# Patient Record
Sex: FEMALE | Race: BLACK OR AFRICAN AMERICAN | Employment: UNEMPLOYED | URBAN - METROPOLITAN AREA
[De-identification: names, ages, dates, MRNs, and addresses within clinical notes are randomized per-mention and may not be internally consistent; named-entity substitution may affect disease eponyms.]

---

## 2018-02-11 ENCOUNTER — ED HISTORICAL/CONVERTED ENCOUNTER (OUTPATIENT)
Dept: OTHER | Age: 33
End: 2018-02-11

## 2018-05-17 LAB
ANTIBODY SCREEN, EXTERNAL: NEGATIVE
CHLAMYDIA, EXTERNAL: NEGATIVE
HBSAG, EXTERNAL: NEGATIVE
HIV, EXTERNAL: NEGATIVE
N. GONORRHEA, EXTERNAL: NEGATIVE
RPR, EXTERNAL: REACTIVE
RUBELLA, EXTERNAL: NORMAL
TYPE, ABO & RH, EXTERNAL: NORMAL

## 2018-07-03 ENCOUNTER — HOSPITAL ENCOUNTER (OUTPATIENT)
Dept: PERINATAL CARE | Age: 33
Discharge: HOME | End: 2018-07-03
Attending: OBSTETRICS & GYNECOLOGY
Payer: MEDICAID

## 2018-07-03 PROCEDURE — 76811 OB US DETAILED SNGL FETUS: CPT | Performed by: OBSTETRICS & GYNECOLOGY

## 2018-08-24 ENCOUNTER — HOSPITAL ENCOUNTER (INPATIENT)
Age: 33
LOS: 6 days | Discharge: HOME | DRG: 560 | End: 2018-08-31
Attending: OBSTETRICS & GYNECOLOGY | Admitting: OBSTETRICS & GYNECOLOGY
Payer: MEDICAID

## 2018-08-24 DIAGNOSIS — Z34.90 PREGNANCY, UNSPECIFIED GESTATIONAL AGE: Primary | ICD-10-CM

## 2018-08-24 LAB
ALBUMIN SERPL-MCNC: 2.6 G/DL (ref 3.5–5)
ALBUMIN/GLOB SERPL: 0.6 {RATIO} (ref 1.1–2.2)
ALP SERPL-CCNC: 153 U/L (ref 45–117)
ALT SERPL-CCNC: 15 U/L (ref 12–78)
ANION GAP SERPL CALC-SCNC: 11 MMOL/L (ref 5–15)
AST SERPL-CCNC: 15 U/L (ref 15–37)
BASOPHILS # BLD: 0 K/UL (ref 0–0.1)
BASOPHILS NFR BLD: 0 % (ref 0–1)
BILIRUB SERPL-MCNC: 0.3 MG/DL (ref 0.2–1)
BUN SERPL-MCNC: 5 MG/DL (ref 6–20)
BUN/CREAT SERPL: 7 (ref 12–20)
CALCIUM SERPL-MCNC: 8.6 MG/DL (ref 8.5–10.1)
CHLORIDE SERPL-SCNC: 105 MMOL/L (ref 97–108)
CO2 SERPL-SCNC: 22 MMOL/L (ref 21–32)
CREAT SERPL-MCNC: 0.73 MG/DL (ref 0.55–1.02)
CREAT UR-MCNC: 142.02 MG/DL
DIFFERENTIAL METHOD BLD: ABNORMAL
EOSINOPHIL # BLD: 0.1 K/UL (ref 0–0.4)
EOSINOPHIL NFR BLD: 1 % (ref 0–7)
ERYTHROCYTE [DISTWIDTH] IN BLOOD BY AUTOMATED COUNT: 14.2 % (ref 11.5–14.5)
EST. AVERAGE GLUCOSE BLD GHB EST-MCNC: 126 MG/DL
GLOBULIN SER CALC-MCNC: 4.3 G/DL (ref 2–4)
GLUCOSE BLD STRIP.AUTO-MCNC: 112 MG/DL (ref 65–100)
GLUCOSE BLD STRIP.AUTO-MCNC: 147 MG/DL (ref 65–100)
GLUCOSE BLD STRIP.AUTO-MCNC: 89 MG/DL (ref 65–100)
GLUCOSE SERPL-MCNC: 93 MG/DL (ref 65–100)
HBA1C MFR BLD: 6 % (ref 4.2–6.3)
HCT VFR BLD AUTO: 32.8 % (ref 35–47)
HGB BLD-MCNC: 10.8 G/DL (ref 11.5–16)
IMM GRANULOCYTES # BLD: 0.1 K/UL (ref 0–0.04)
IMM GRANULOCYTES NFR BLD AUTO: 1 % (ref 0–0.5)
LYMPHOCYTES # BLD: 3 K/UL (ref 0.8–3.5)
LYMPHOCYTES NFR BLD: 27 % (ref 12–49)
MCH RBC QN AUTO: 28.2 PG (ref 26–34)
MCHC RBC AUTO-ENTMCNC: 32.9 G/DL (ref 30–36.5)
MCV RBC AUTO: 85.6 FL (ref 80–99)
MONOCYTES # BLD: 1 K/UL (ref 0–1)
MONOCYTES NFR BLD: 9 % (ref 5–13)
NEUTS SEG # BLD: 6.7 K/UL (ref 1.8–8)
NEUTS SEG NFR BLD: 61 % (ref 32–75)
NRBC # BLD: 0 K/UL (ref 0–0.01)
NRBC BLD-RTO: 0 PER 100 WBC
PLATELET # BLD AUTO: 185 K/UL (ref 150–400)
PMV BLD AUTO: 12.5 FL (ref 8.9–12.9)
POTASSIUM SERPL-SCNC: 4 MMOL/L (ref 3.5–5.1)
PROT SERPL-MCNC: 6.9 G/DL (ref 6.4–8.2)
PROT UR-MCNC: 34 MG/DL (ref 0–11.9)
PROT/CREAT UR-RTO: 0.2
RBC # BLD AUTO: 3.83 M/UL (ref 3.8–5.2)
SERVICE CMNT-IMP: ABNORMAL
SERVICE CMNT-IMP: ABNORMAL
SERVICE CMNT-IMP: NORMAL
SODIUM SERPL-SCNC: 138 MMOL/L (ref 136–145)
URATE SERPL-MCNC: 3.4 MG/DL (ref 2.6–6)
WBC # BLD AUTO: 10.9 K/UL (ref 3.6–11)

## 2018-08-24 PROCEDURE — 80053 COMPREHEN METABOLIC PANEL: CPT | Performed by: OBSTETRICS & GYNECOLOGY

## 2018-08-24 PROCEDURE — 76816 OB US FOLLOW-UP PER FETUS: CPT | Performed by: OBSTETRICS & GYNECOLOGY

## 2018-08-24 PROCEDURE — 85025 COMPLETE CBC W/AUTO DIFF WBC: CPT | Performed by: OBSTETRICS & GYNECOLOGY

## 2018-08-24 PROCEDURE — 36415 COLL VENOUS BLD VENIPUNCTURE: CPT | Performed by: OBSTETRICS & GYNECOLOGY

## 2018-08-24 PROCEDURE — 75410000002 HC LABOR FEE PER 1 HR: Performed by: OBSTETRICS & GYNECOLOGY

## 2018-08-24 PROCEDURE — 99285 EMERGENCY DEPT VISIT HI MDM: CPT

## 2018-08-24 PROCEDURE — 76819 FETAL BIOPHYS PROFIL W/O NST: CPT | Performed by: OBSTETRICS & GYNECOLOGY

## 2018-08-24 PROCEDURE — 75410000003 HC RECOV DEL/VAG/CSECN EA 0.5 HR: Performed by: OBSTETRICS & GYNECOLOGY

## 2018-08-24 PROCEDURE — 82570 ASSAY OF URINE CREATININE: CPT | Performed by: OBSTETRICS & GYNECOLOGY

## 2018-08-24 PROCEDURE — 59025 FETAL NON-STRESS TEST: CPT

## 2018-08-24 PROCEDURE — 75410000000 HC DELIVERY VAGINAL/SINGLE: Performed by: OBSTETRICS & GYNECOLOGY

## 2018-08-24 PROCEDURE — 84156 ASSAY OF PROTEIN URINE: CPT | Performed by: OBSTETRICS & GYNECOLOGY

## 2018-08-24 PROCEDURE — 82962 GLUCOSE BLOOD TEST: CPT

## 2018-08-24 PROCEDURE — 87255 GENET VIRUS ISOLATE HSV: CPT | Performed by: OBSTETRICS & GYNECOLOGY

## 2018-08-24 PROCEDURE — 83036 HEMOGLOBIN GLYCOSYLATED A1C: CPT | Performed by: OBSTETRICS & GYNECOLOGY

## 2018-08-24 PROCEDURE — 76060000078 HC EPIDURAL ANESTHESIA: Performed by: ANESTHESIOLOGY

## 2018-08-24 PROCEDURE — 74011250637 HC RX REV CODE- 250/637: Performed by: OBSTETRICS & GYNECOLOGY

## 2018-08-24 PROCEDURE — 84550 ASSAY OF BLOOD/URIC ACID: CPT | Performed by: OBSTETRICS & GYNECOLOGY

## 2018-08-24 PROCEDURE — 87491 CHLMYD TRACH DNA AMP PROBE: CPT | Performed by: OBSTETRICS & GYNECOLOGY

## 2018-08-24 RX ORDER — METRONIDAZOLE 250 MG/1
500 TABLET ORAL
Status: DISCONTINUED | OUTPATIENT
Start: 2018-08-24 | End: 2018-08-24

## 2018-08-24 RX ORDER — METRONIDAZOLE 250 MG/1
2000 TABLET ORAL
Status: COMPLETED | OUTPATIENT
Start: 2018-08-24 | End: 2018-08-24

## 2018-08-24 RX ORDER — FLUCONAZOLE 100 MG/1
150 TABLET ORAL DAILY
Status: DISCONTINUED | OUTPATIENT
Start: 2018-08-25 | End: 2018-08-24

## 2018-08-24 RX ORDER — FLUCONAZOLE 100 MG/1
150 TABLET ORAL
Status: COMPLETED | OUTPATIENT
Start: 2018-08-24 | End: 2018-08-24

## 2018-08-24 RX ADMIN — FLUCONAZOLE 150 MG: 100 TABLET ORAL at 14:36

## 2018-08-24 RX ADMIN — METRONIDAZOLE 2000 MG: 250 TABLET ORAL at 12:45

## 2018-08-24 NOTE — IP AVS SNAPSHOT
Gume Yang 
 
 
 [address] [id number] Patient: Trinity Norman MRN: [id number] :1985 A check virginia indicates which time of day the medication should be taken. My Medications START taking these medications Instructions Each Dose to Equal  
 Morning Noon Evening Bedtime  
 oxyCODONE-acetaminophen 5-325 mg per tablet Commonly known as:  PERCOCET Your last dose was: Your next dose is: Take 1 Tab by mouth every four (4) hours as needed. Max Daily Amount: 6 Tabs. 1 Tab Where to Get Your Medications Information on where to get these meds will be given to you by the nurse or doctor. ! Ask your nurse or doctor about these medications  
  oxyCODONE-acetaminophen 5-325 mg per tablet

## 2018-08-24 NOTE — PROGRESS NOTES
1035:  The patient presents to labor and delivery for Pre-E evaluation. 1055:  Labs drawn and sent. 1150:  Telephone call to notify Dr. Carmen Joseph that the patient had arrived to [facility] to be evaluated. Lab results were reviewed, blood pressures were reviewed. Orders received. 1155:  Called the  center to notify them that Dr. Carmen Joseph had requested a consult.

## 2018-08-24 NOTE — H&P
History & Physical    Name: Hardy Lovell MRN: [id number]  SSN: [id number]    YOB: 1985  Age: 29 y.o. Sex: female      Subjective:     Reason for Admission:  Pregnancy and Diabetes Gestational - Poorly Controlled  and elevated blood pressures in the office yesterday    History of Present Illness: Ms. Nayan Hamilton is a 29 y.o.  female with an estimated gestational age of 42w0d with Estimated Date of Delivery: 18. Patient complains of vulvovaginal itching/irritation. for several  days. Pregnancy has been complicated by diabetes - gestational and poor compliance. Patient denies contractions, headache  and visual disturbances. Seen in the office yesterday for the first time in about 8 weeks. She had been diagnosed with GDM but did not go to seen endocrinology. In the office yesterday she had elevated blood pressures and vulvovaginal sx. Exam showed trich and yeast. Also had some lacerations/?ulcerations on vulva (pt reports lots of scratching). Hx of c/s with last pregnancy, 3 SVDs prior. Wants TOLAC. Tested positive for syphilis at first prenatal visit, treated. OB History    Para Term  AB Living   5 4       SAB TAB Ectopic Molar Multiple Live Births              # Outcome Date GA Lbr Jay/2nd Weight Sex Delivery Anes PTL Lv   5 Current            4 Para            3 Para            2 Para            1 Para                 Past Medical History:   Diagnosis Date    Diabetes ([facility] [address]     Syphilis      No past surgical history on file. Social History     Occupational History    Not on file. Social History Main Topics    Smoking status: Former Smoker     Packs/day: 0.25     Years: 2.00    Smokeless tobacco: Never Used    Alcohol use No    Drug use: No    Sexual activity: Yes     Partners: Male      No family history on file.     No Known Allergies  Prior to Admission medications    Not on File        Review of Systems:  Pertinent items are noted in the History of Present Illness. Objective:     Vitals:    Vitals:    18 1131 18 1136 18 1140 18 1449   BP:   127/77    Pulse:   87    Resp:       Temp:       SpO2: 100% 100%     Weight:    134.3 kg (296 lb)   Height:    5' 6\" (1.676 m)      Temp (24hrs), Av.3 °F (36.8 °C), Min:98.3 °F (36.8 °C), Max:98.3 °F (36.8 °C)    BP  Min: 124/70  Max: 135/75     Physical Exam:  Patient without distress. Heart: Regular rate and rhythm, S1S2 present or 2/6 DENNIS  Lung: clear to auscultation throughout lung fields, no wheezes, no rales, no rhonchi and normal respiratory effort  Abdomen: Obese, soft, nontender  Fundus: soft and non tender  Perineum: blood absent, amniotic fluid absent  Cervical Exam: 0 cm dilated    Lower Extremities:  - Edema No   - No cords or calf tenderness. Membranes:  Intact  Uterine Activity:  None  Fetal Heart Rate:  Reactive       Lab/Data Review:  Recent Results (from the past 24 hour(s))   CBC WITH AUTOMATED DIFF    Collection Time: 18 10:44 AM   Result Value Ref Range    WBC 10.9 3.6 - 11.0 K/uL    RBC 3.83 3.80 - 5.20 M/uL    HGB 10.8 (L) 11.5 - 16.0 g/dL    HCT 32.8 (L) 35.0 - 47.0 %    MCV 85.6 80.0 - 99.0 FL    MCH 28.2 26.0 - 34.0 PG    MCHC 32.9 30.0 - 36.5 g/dL    RDW 14.2 11.5 - 14.5 %    PLATELET 373 363 - 221 K/uL    MPV 12.5 8.9 - 12.9 FL    NRBC 0.0 0  WBC    ABSOLUTE NRBC 0.00 0.00 - 0.01 K/uL    NEUTROPHILS 61 32 - 75 %    LYMPHOCYTES 27 12 - 49 %    MONOCYTES 9 5 - 13 %    EOSINOPHILS 1 0 - 7 %    BASOPHILS 0 0 - 1 %    IMMATURE GRANULOCYTES 1 (H) 0.0 - 0.5 %    ABS. NEUTROPHILS 6.7 1.8 - 8.0 K/UL    ABS. LYMPHOCYTES 3.0 0.8 - 3.5 K/UL    ABS. MONOCYTES 1.0 0.0 - 1.0 K/UL    ABS. EOSINOPHILS 0.1 0.0 - 0.4 K/UL    ABS. BASOPHILS 0.0 0.0 - 0.1 K/UL    ABS. IMM.  GRANS. 0.1 (H) 0.00 - 0.04 K/UL    DF AUTOMATED     METABOLIC PANEL, COMPREHENSIVE    Collection Time: 18 10:44 AM   Result Value Ref Range    Sodium 138 136 - 145 mmol/L    Potassium 4.0 3.5 - 5.1 mmol/L    Chloride 105 97 - 108 mmol/L    CO2 22 21 - 32 mmol/L    Anion gap 11 5 - 15 mmol/L    Glucose 93 65 - 100 mg/dL    BUN 5 (L) 6 - 20 MG/DL    Creatinine 0.73 0.55 - 1.02 MG/DL    BUN/Creatinine ratio 7 (L) 12 - 20      GFR est AA >60 >60 ml/min/1.73m2    GFR est non-AA >60 >60 ml/min/1.73m2    Calcium 8.6 8.5 - 10.1 MG/DL    Bilirubin, total 0.3 0.2 - 1.0 MG/DL    ALT (SGPT) 15 12 - 78 U/L    AST (SGOT) 15 15 - 37 U/L    Alk. phosphatase 153 (H) 45 - 117 U/L    Protein, total 6.9 6.4 - 8.2 g/dL    Albumin 2.6 (L) 3.5 - 5.0 g/dL    Globulin 4.3 (H) 2.0 - 4.0 g/dL    A-G Ratio 0.6 (L) 1.1 - 2.2     URIC ACID    Collection Time: 18 10:44 AM   Result Value Ref Range    Uric acid 3.4 2.6 - 6.0 MG/DL   PROTEIN/CREATININE RATIO, URINE    Collection Time: 18 10:44 AM   Result Value Ref Range    Protein, urine random 34 (H) 0.0 - 11.9 mg/dL    Creatinine, urine 142.02 mg/dL    Protein/Creat. urine Ratio 0.2     HEMOGLOBIN A1C WITH EAG    Collection Time: 18 10:44 AM   Result Value Ref Range    Hemoglobin A1c 6.0 4.2 - 6.3 %    Est. average glucose 126 mg/dL   GLUCOSE, POC    Collection Time: 18  2:42 PM   Result Value Ref Range    Glucose (POC) 112 (H) 65 - 100 mg/dL    Performed by Lawyer Murcia    GLUCOSE, POC    Collection Time: 18  6:08 PM   Result Value Ref Range    Glucose (POC) 147 (H) 65 - 100 mg/dL    Performed by Raghav Gonzalez and Plan:32 yo  with IUP at 38 weeks  2. GDM--unsure of control as above. Will check BS over next 24 hours. 3. Elevated BPs in office, basically wnl here. Labs wnl thus far, getting 24 hr urine. 4. Vulvar changes--HSV culture today. 5. Trich--check for GC/C     Active Problems:    * No active hospital problems.  *     - Diabetes-Gestational:  Blood sugar monitoring at fasting and 2 hours after each meal    Signed By:  Milad Fuentes MD     2018

## 2018-08-24 NOTE — IP AVS SNAPSHOT
[facility] 
 
 
 [address] [facility] 
[phone] Patient: Giuseppe Schultz MRN: [id number] :1985 About your hospitalization You were admitted on:  2018 [facility] in the:  [facility] [address] You were discharged on:  2018 Why you were hospitalized Your primary diagnosis was:  Not on File Your diagnoses also included:  Pregnancy Follow-up Information Follow up With Details Comments Contact Julius Cotton, [address]  [address] [facility] 
[phone] Discharge Orders None A check virginia indicates which time of day the medication should be taken. My Medications START taking these medications Instructions Each Dose to Equal  
 Morning Noon Evening Bedtime  
 oxyCODONE-acetaminophen 5-325 mg per tablet Commonly known as:  PERCOCET Your last dose was: Your next dose is: Take 1 Tab by mouth every four (4) hours as needed. Max Daily Amount: 6 Tabs. 1 Tab Where to Get Your Medications Information on where to get these meds will be given to you by the nurse or doctor. ! Ask your nurse or doctor about these medications  
  oxyCODONE-acetaminophen 5-325 mg per tablet Opioid Education Prescription Opioids: What You Need to Know: 
 
Prescription opioids can be used to help relieve moderate-to-severe pain and are often prescribed following a surgery or injury, or for certain health conditions. These medications can be an important part of treatment but also come with serious risks. Opioids are strong pain medicines. Examples include hydrocodone, oxycodone, fentanyl, and morphine. Heroin is an example of an illegal opioid. It is important to work with your health care provider to make sure you are getting the safest, most effective care. WHAT ARE THE RISKS AND SIDE EFFECTS OF OPIOID USE? Prescription opioids carry serious risks of addiction and overdose, especially with prolonged use. An opioid overdose, often marked by slow breathing, can cause sudden death. The use of prescription opioids can have a number of side effects as well, even when taken as directed. · Tolerance-meaning you might need to take more of a medication for the same pain relief · Physical dependence-meaning you have symptoms of withdrawal when the medication is stopped. Withdrawal symptoms can include nausea, sweating, chills, diarrhea, stomach cramps, and muscle aches. Withdrawal can last up to several weeks, depending on which drug you took and how long you took it. · Increased sensitivity to pain · Constipation · Nausea, vomiting, and dry mouth · Sleepiness and dizziness · Confusion · Depression · Low levels of testosterone that can result in lower sex drive, energy, and strength · Itching and sweating RISKS ARE GREATER WITH:      
· History of drug misuse, substance use disorder, or overdose · Mental health conditions (such as depression or anxiety) · Sleep apnea · Older age (72 years or older) · Pregnancy Avoid alcohol while taking prescription opioids. Also, unless specifically advised by your health care provider, medications to avoid include: · Benzodiazepines (such as Xanax or Valium) · Muscle relaxants (such as Soma or Flexeril) · Hypnotics (such as Ambien or Lunesta) · Other prescription opioids KNOW YOUR OPTIONS Talk to your health care provider about ways to manage your pain that don't involve prescription opioids. Some of these options may actually work better and have fewer risks and side effects. Options may include: 
· Pain relievers such as acetaminophen, ibuprofen, and naproxen · Some medications that are also used for depression or seizures · Physical therapy and exercise · Counseling to help patients learn how to cope better with triggers of pain and stress. · Application of heat or cold compress · Massage therapy · Relaxation techniques Be Informed Make sure you know the name of your medication, how much and how often to take it, and its potential risks & side effects. IF YOU ARE PRESCRIBED OPIOIDS FOR PAIN: 
· Never take opioids in greater amounts or more often than prescribed. Remember the goal is not to be pain-free but to manage your pain at a tolerable level. · Follow up with your primary care provider to: · Work together to create a plan on how to manage your pain. · Talk about ways to help manage your pain that don't involve prescription opioids. · Talk about any and all concerns and side effects. · Help prevent misuse and abuse. · Never sell or share prescription opioids · Help prevent misuse and abuse. · Store prescription opioids in a secure place and out of reach of others (this may include visitors, children, friends, and family). · Safely dispose of unused/unwanted prescription opioids: Find your community drug take-back program or your pharmacy mail-back program, or flush them down the toilet, following guidance from the Food and Drug Administration (www.fda.gov/Drugs/ResourcesForYou). · Visit www.cdc.gov/drugoverdose to learn about the risks of opioid abuse and overdose. · If you believe you may be struggling with addiction, tell your health care provider and ask for guidance or call [address] [place], [place] [address] at 2-853-434-UWFZ. Discharge Instructions Discharge Instructions for Vaginal Delivery Patient ID: 
Trinity Norman 
[id number] 
4 y.o. 
1985 Take Home Medications Continue taking your prenatal vitamins if you are breastfeeding. Follow-up care is a key part of your treatment and safety.  Please schedule and keep appointments. Follow-up with your primary OB in 6 weeks. Activity Avoid anything in your vagina for 6 weeks (no intercourse, tampons, or douching). You may drive unless you are taking prescription pain medications. Climbing stairs and light lifting are okay. Please avoid excessive exercise, though walking is okay- you'll be tired! Diet Regular diet as tolerated. Be sure to drink plenty of fluids if you are breastfeeding. Wound care If you have stitches, continue to rinse with a squirt bottle of warm water each time you void for about 7-10 days. .  Your stitches will gradually dissolve over four to eight weeks. Sitz baths are also helpful to keep the wound clean, encourage healing, and to help with pain associated with the stitches or hemorrhoids. You can use either a sitz bath basin or a bathtub filled with 2-3\" inches of plain warm water. Soak for 10 minutes 3 times a day as tolerated. Pain Management 1. Over the counter medications such as Tylenol and ibuprofen (Motrin or Advil) are ideal.  These may be taken together, alternating doses. You may  take the maximum dose:  Motrin or Advil (generic ibuprofen), either 3 tablets every 6 hours or 4 tablets every 8 hours or Tylenol (acetominophen) 1000mg every 6 hours (equivalent to 2 extra strength Tylenol). 2. You may also have a precrescription for stronger pain medication. Take only as needed and transition to over the counter medication in the next few days. Minimize amounts of the prescription medication, as it can be habit-forming and will worsen or cause constipation. Most patients will find that within a couple of days, their pain is adequately controlled using only over-the-counter medications. 3. The prescription pain medication is mixed with Tylenol, therefore, you should not take any extra Tylenol or acetaminophen until you have reduced your prescription pain medication. 4. Add heating pad or sitz baths as needed. Add hemorrhoid wipes or ointments if needed Constipation 1. Constipation is normal after pregnancy and delivery, especially while taking prescription narcotic pain medication. 2. Over the counter remedies including ducosate (Colace), take 1-2 capsules 1-2 times daily for soft stool as needed. You may also add/ try milk of magnesia or rectal remedies such as Dulcolax or Fleets enema. Recovery: What to Expect at [facility] 1. Fatigue is expected. Try to rest when you can and don't worry about doing housework or other tasks which can wait. 2. The soreness along your bottom will improve significantly over the first 2 weeks, but it may take 6 weeks before you are completely recovered. 3. Back pain or general body aches or muscle soreness are expected and should improve with acetominophen or ibuprofen. 4. Leg swelling due to pregnancy and/or IV fluids given in the hospital will take about two weeks to resolve. 5. Most women experience some form of the \"Baby Blues\" after having a baby. Feeling emotional, tearful, frustrated, anxious, sad, and irritable some of the time is normal and go away after about 2 weeks. Adequate rest and help from your family will help. Take breaks from caring for the baby. Call your doctor if your symptoms seem severe, last more than 2 weeks, or seem to be getting worse instead of better. Get help immediately if you have thoughts of wanting to hurt yourself or others! Call your doctor or seek immediate medical care if you have: 
Heavy vaginal bleeding, soaking through one or more pads an hour for several hours. Foul-smelling discharge from your vagina or incision. Consistent nausea and vomiting and cannot keep fluids down. Consistent pain that does not get better after you take pain medicine. Sudden chest pain and shortness of breath Signs of a blood clot: pain/ swelling/ increasing redness in your lower extremeties Signs of infection: increased pain in your abdomen or vaginal area; red streaks, warmth, or tenderness of your breasts; fever of 100.5 F or greater [employer] Announcement We are excited to announce that we are making your provider's discharge notes available to you in [email or web address]. You will see these notes when they are completed and signed by the physician that discharged you from your recent hospital stay. If you have any questions or concerns about any information you see in Judic[employer], please call the Health Information Department where you were seen or reach out to your Primary Care Provider for more information about your plan of care. Introducing [facility]! Sandra Castro introduces [email or web address] patient portal. Now you can access parts of your medical record, email your doctor's office, and request medication refills online. 1. In your internet browser, go to [email or web address] 2. Click on the First Time User? Click Here link in the Sign In box. You will see the New Member Sign Up page. 3. Enter your [email or web address] Access Code exactly as it appears below. You will not need to use this code after youve completed the sign-up process. If you do not sign up before the expiration date, you must request a new code. · [email or web address] Access Code: [email or web address] Expires: 11/29/2018  5:52 PM 
 
4. Enter the last four digits of your Social Security Number (xxxx) and Date of Birth (mm/dd/yyyy) as indicated and click Submit. You will be taken to the next sign-up page. 5. Create a Medifyt ID. This will be your [email or web address] login ID and cannot be changed, so think of one that is secure and easy to remember. 6. Create a Medifyt password. You can change your password at any time. 7. Enter your Password Reset Question and Answer. This can be used at a later time if you forget your password. 8. Enter your e-mail address.  You will receive e-mail notification when new information is available in [employer]. 9. Click Sign Up. You can now view and download portions of your medical record. 10. Click the Download Summary menu link to download a portable copy of your medical information. If you have questions, please visit the Frequently Asked Questions section of the [employer] website. Remember, [employer] is NOT to be used for urgent needs. For medical emergencies, dial 911. Now available from your iPhone and Android! Introducing Karri Alexandre As a [employer] [facility] patient, I wanted to make you aware of our electronic visit tool called Karri Alexandre. [employer] 24/7 allows you to connect within minutes with a medical provider 24 hours a day, seven days a week via a mobile device or tablet or logging into a secure website from your computer. You can access Karri Alexandre from anywhere in the [place]. A virtual visit might be right for you when you have a simple condition and feel like you just dont want to get out of bed, or cant get away from work for an appointment, when your regular [facility] [employer] [facility] provider is not available (evenings, weekends or holidays), or when youre out of town and need minor care. Electronic visits cost only $49 and if the [employer] 24/7 provider determines a prescription is needed to treat your condition, one can be electronically transmitted to a nearby pharmacy*. Please take a moment to enroll today if you have not already done so. The enrollment process is free and takes just a few minutes. To enroll, please download the [employer] 24/7 hunter to your tablet or phone, or visit [email or web address] to enroll on your computer. And, as an [address] [place] [address] patient with a [email or web address] account, the results of your visits will be scanned into your electronic medical record and your primary care provider will be able to view the scanned results. We urge you to continue to see your regular [facility] provider for your ongoing medical care. And while your primary care provider may not be the one available when you seek a Karri Perezmichael virtual visit, the peace of mind you get from getting a real diagnosis real time can be priceless. For more information on Karri Perezihsanfin, view our Frequently Asked Questions (FAQs) at [email or web address]. Sincerely, 
 
Brittany Price MD 
Chief Medical Officer [employer] *:  certain medications cannot be prescribed via Karri Alexandre Providers Seen During Your Hospitalization Provider Specialty Primary office phone Candice Griffin MD Obstetrics & Gynecology [phone] Your Primary Care Physician (PCP) Primary Care Physician Office Phone Office Fax Betty Pena [phone] You are allergic to the following No active allergies Recent Documentation Height Weight Breastfeeding? BMI OB Status Smoking Status 1.676 m 134.3 kg Yes 47.78 kg/m2 Recent pregnancy Former Smoker Emergency Contacts Name Discharge Info Relation Home Work Mobile [facility] CAREGIVER [3] Mother [14] [phone] Patient Belongings The following personal items are in your possession at time of discharge: 
     Visual Aid: None          Jewelry: Other (comment) (lip ring)  Clothing: At bedside, Footwear, Pants, Shirt    Other Valuables: Cell Phone  Personal Items Sent to Safe: none Please provide this summary of care documentation to your next provider. Signatures-by signing, you are acknowledging that this After Visit Summary has been reviewed with you and you have received a copy. Patient Signature:  ____________________________________________________________ Date:  ____________________________________________________________  
  
Bettye Carney  Provider Signature: ____________________________________________________________ Date:  ____________________________________________________________

## 2018-08-25 PROBLEM — Z34.90 PREGNANCY: Status: ACTIVE | Noted: 2018-08-25

## 2018-08-25 LAB
COLLECT DURATION TIME UR: 24 HR
COLLECT DURATION TIME UR: 24 HR
CREAT 24H UR-MRATE: 2219 MG/24HR (ref 600–1800)
GLUCOSE BLD STRIP.AUTO-MCNC: 108 MG/DL (ref 65–100)
GLUCOSE BLD STRIP.AUTO-MCNC: 128 MG/DL (ref 65–100)
GLUCOSE BLD STRIP.AUTO-MCNC: 137 MG/DL (ref 65–100)
GLUCOSE BLD STRIP.AUTO-MCNC: 162 MG/DL (ref 65–100)
GLUCOSE BLD STRIP.AUTO-MCNC: 82 MG/DL (ref 65–100)
PROT 24H UR-MRATE: 405 MG/24HR
SERVICE CMNT-IMP: ABNORMAL
SERVICE CMNT-IMP: NORMAL
SPECIMEN VOL ?TM UR: 2700 ML
SPECIMEN VOL ?TM UR: 2700 ML
UR CULT HOLD, URHOLD: NORMAL

## 2018-08-25 PROCEDURE — 65270000029 HC RM PRIVATE

## 2018-08-25 PROCEDURE — 59025 FETAL NON-STRESS TEST: CPT

## 2018-08-25 PROCEDURE — 82962 GLUCOSE BLOOD TEST: CPT

## 2018-08-25 PROCEDURE — 74011636637 HC RX REV CODE- 636/637: Performed by: OBSTETRICS & GYNECOLOGY

## 2018-08-25 PROCEDURE — 75410000002 HC LABOR FEE PER 1 HR: Performed by: OBSTETRICS & GYNECOLOGY

## 2018-08-25 PROCEDURE — 74011250637 HC RX REV CODE- 250/637: Performed by: OBSTETRICS & GYNECOLOGY

## 2018-08-25 RX ORDER — INSULIN LISPRO 100 [IU]/ML
INJECTION, SOLUTION INTRAVENOUS; SUBCUTANEOUS
Status: DISCONTINUED | OUTPATIENT
Start: 2018-08-25 | End: 2018-08-26

## 2018-08-25 RX ORDER — MAGNESIUM SULFATE 100 %
4 CRYSTALS MISCELLANEOUS AS NEEDED
Status: DISCONTINUED | OUTPATIENT
Start: 2018-08-25 | End: 2018-08-29

## 2018-08-25 RX ORDER — ZOLPIDEM TARTRATE 5 MG/1
5 TABLET ORAL
Status: DISCONTINUED | OUTPATIENT
Start: 2018-08-25 | End: 2018-08-29

## 2018-08-25 RX ORDER — DEXTROSE 50 % IN WATER (D50W) INTRAVENOUS SYRINGE
12.5-25 AS NEEDED
Status: DISCONTINUED | OUTPATIENT
Start: 2018-08-25 | End: 2018-08-29

## 2018-08-25 RX ADMIN — HUMAN INSULIN 8 UNITS: 100 INJECTION, SUSPENSION SUBCUTANEOUS at 22:02

## 2018-08-25 RX ADMIN — ZOLPIDEM TARTRATE 5 MG: 5 TABLET ORAL at 23:02

## 2018-08-25 NOTE — PROGRESS NOTES
1915 Assumed care of pt at this time from [address] [place] [address], Ruddy Nunez RN.  2015 Pt in bed resting on phone, declines assessment at this time. 2300 Pt resting in bed, declines any needs at this time. 0700 Bedside shift change report given to Linda Bolanos RN (oncoming nurse) by CALEB Oliveira RN (offgoing nurse). Report included the following information SBAR, Kardex, Intake/Output, MAR, Recent Results and Med Rec Status.

## 2018-08-25 NOTE — PROGRESS NOTES
1923: SBAR report received from off coming nurse Vikki Perla RN, with preceptor CHIKI Rebolledo RNC present. 1940: Student nurse and RN entered pt's room to perform assessment. Pt does not appear to be in acute distress, pt has no complaints other than stating she has irregular mild contractions. Education on Intake and Output given to pt. Pt to record urine output on white board. After complete assessment, pt is resting comfortably in bed. Filled up water jug to 1000ml. 2010: Pt signed admission consents. 2216: Pt given insulin. Pt given diet ginger ale and is resting comfortably. 2221: Order obtained for Ambien 5mg. Pt declined at this time, requested to take at 2300.    2302: Ambien 5mg given to pt. Pt resting comfortably with no complaints. 0140: Rounded on pt. Pt asleep and breathing evenly. 0305: Rounded on pt. Pt asleep and breathing evenly. 0500: Rounded on pt. Pt awake in bed resting quietly, no complaints at this time.

## 2018-08-25 NOTE — PROGRESS NOTES
High Risk Obstetrics Progress Note    Name: José Miguel Anderson MRN: [id number]  SSN: [id number]    YOB: 1985  Age: 29 y.o. Sex: female      Subjective:      LOS: 0 days    Estimated Date of Delivery: 18   Gestational Age Today: 43w4d     Patient admitted for diabetes - gestational and elevated BPs. . States she does have normal fetal movement and does not have abdominal pain  , vaginal bleeding  and visual disturbances. Mild HA this am.     Objective:     Vitals:  Blood pressure 142/78, pulse 86, temperature 98.2 °F (36.8 °C), resp. rate 18, height 5' 6\" (1.676 m), weight 134.3 kg (296 lb), SpO2 (!) 82 %, currently breastfeeding. Temp (24hrs), Av.3 °F (36.8 °C), Min:98.2 °F (36.8 °C), Max:98.5 °F (98.3 °C)    Systolic (20ACY), ZOK:909 , Min:120 , PWC:244      Diastolic (61KCL), GLD:44, Min:61, Max:84       Intake and Output:          Physical Exam:  Patient without distress. Abdomen: soft, nontender  Fundus: soft and non tender  Lower Extremities:  - Edema No   - No cords or calf tenderness. Membranes:  Intact    Uterine Activity:  None    Fetal Heart Rate:  Has been reactive. Labs:   Recent Results (from the past 36 hour(s))   CBC WITH AUTOMATED DIFF    Collection Time: 18 10:44 AM   Result Value Ref Range    WBC 10.9 3.6 - 11.0 K/uL    RBC 3.83 3.80 - 5.20 M/uL    HGB 10.8 (L) 11.5 - 16.0 g/dL    HCT 32.8 (L) 35.0 - 47.0 %    MCV 85.6 80.0 - 99.0 FL    MCH 28.2 26.0 - 34.0 PG    MCHC 32.9 30.0 - 36.5 g/dL    RDW 14.2 11.5 - 14.5 %    PLATELET 279 204 - 832 K/uL    MPV 12.5 8.9 - 12.9 FL    NRBC 0.0 0  WBC    ABSOLUTE NRBC 0.00 0.00 - 0.01 K/uL    NEUTROPHILS 61 32 - 75 %    LYMPHOCYTES 27 12 - 49 %    MONOCYTES 9 5 - 13 %    EOSINOPHILS 1 0 - 7 %    BASOPHILS 0 0 - 1 %    IMMATURE GRANULOCYTES 1 (H) 0.0 - 0.5 %    ABS. NEUTROPHILS 6.7 1.8 - 8.0 K/UL    ABS. LYMPHOCYTES 3.0 0.8 - 3.5 K/UL    ABS. MONOCYTES 1.0 0.0 - 1.0 K/UL    ABS.  EOSINOPHILS 0.1 0.0 - 0.4 K/UL ABS. BASOPHILS 0.0 0.0 - 0.1 K/UL    ABS. IMM. GRANS. 0.1 (H) 0.00 - 0.04 K/UL    DF AUTOMATED     METABOLIC PANEL, COMPREHENSIVE    Collection Time: 08/24/18 10:44 AM   Result Value Ref Range    Sodium 138 136 - 145 mmol/L    Potassium 4.0 3.5 - 5.1 mmol/L    Chloride 105 97 - 108 mmol/L    CO2 22 21 - 32 mmol/L    Anion gap 11 5 - 15 mmol/L    Glucose 93 65 - 100 mg/dL    BUN 5 (L) 6 - 20 MG/DL    Creatinine 0.73 0.55 - 1.02 MG/DL    BUN/Creatinine ratio 7 (L) 12 - 20      GFR est AA >60 >60 ml/min/1.73m2    GFR est non-AA >60 >60 ml/min/1.73m2    Calcium 8.6 8.5 - 10.1 MG/DL    Bilirubin, total 0.3 0.2 - 1.0 MG/DL    ALT (SGPT) 15 12 - 78 U/L    AST (SGOT) 15 15 - 37 U/L    Alk. phosphatase 153 (H) 45 - 117 U/L    Protein, total 6.9 6.4 - 8.2 g/dL    Albumin 2.6 (L) 3.5 - 5.0 g/dL    Globulin 4.3 (H) 2.0 - 4.0 g/dL    A-G Ratio 0.6 (L) 1.1 - 2.2     URIC ACID    Collection Time: 08/24/18 10:44 AM   Result Value Ref Range    Uric acid 3.4 2.6 - 6.0 MG/DL   PROTEIN/CREATININE RATIO, URINE    Collection Time: 08/24/18 10:44 AM   Result Value Ref Range    Protein, urine random 34 (H) 0.0 - 11.9 mg/dL    Creatinine, urine 142.02 mg/dL    Protein/Creat.  urine Ratio 0.2     HEMOGLOBIN A1C WITH EAG    Collection Time: 08/24/18 10:44 AM   Result Value Ref Range    Hemoglobin A1c 6.0 4.2 - 6.3 %    Est. average glucose 126 mg/dL   GLUCOSE, POC    Collection Time: 08/24/18  2:42 PM   Result Value Ref Range    Glucose (POC) 112 (H) 65 - 100 mg/dL    Performed by Mracia Reynaga, POC    Collection Time: 08/24/18  6:08 PM   Result Value Ref Range    Glucose (POC) 147 (H) 65 - 100 mg/dL    Performed by Josefina Blake    GLUCOSE, POC    Collection Time: 08/24/18  9:58 PM   Result Value Ref Range    Glucose (POC) 89 65 - 100 mg/dL    Performed by [employer], POC    Collection Time: 08/25/18  7:03 AM   Result Value Ref Range    Glucose (POC) 108 (H) 65 - 100 mg/dL    Performed by [employer], POC    Collection Time: 08/25/18  8:59 AM   Result Value Ref Range    Glucose (POC) 137 (H) 65 - 100 mg/dL    Performed by Yanique Owen and Plan:38 1/7 wk IUP with GDM, poorly controlled. Will deliver soon, see #2. 2. Vulvar ulcerations/lacerations--awaiting HSV culture, if neg will prob induce, if positive then repeat c/s.  3. Elevated BPs in office--stable in hospital and labs wnl thus far. 4. +trichomonas--s/p flagyl yesterday, awaiting GC/Chlamydia probe  5. Hx of previous c/s with 3 SVDs prior  5. Morbid obesity      Active Problems:    * No active hospital problems. *     Diabetes-Gestational:  poorly controlled. will d/w MFM.      Signed By: Debra Elam MD     August 25, 2018

## 2018-08-26 LAB
GLUCOSE BLD STRIP.AUTO-MCNC: 118 MG/DL (ref 65–100)
GLUCOSE BLD STRIP.AUTO-MCNC: 138 MG/DL (ref 65–100)
GLUCOSE BLD STRIP.AUTO-MCNC: 157 MG/DL (ref 65–100)
GLUCOSE BLD STRIP.AUTO-MCNC: 94 MG/DL (ref 65–100)
GLUCOSE BLD STRIP.AUTO-MCNC: 94 MG/DL (ref 65–100)
SERVICE CMNT-IMP: ABNORMAL
SERVICE CMNT-IMP: NORMAL
SERVICE CMNT-IMP: NORMAL

## 2018-08-26 PROCEDURE — 59025 FETAL NON-STRESS TEST: CPT

## 2018-08-26 PROCEDURE — 75410000002 HC LABOR FEE PER 1 HR: Performed by: OBSTETRICS & GYNECOLOGY

## 2018-08-26 PROCEDURE — 82962 GLUCOSE BLOOD TEST: CPT

## 2018-08-26 PROCEDURE — 74011636637 HC RX REV CODE- 636/637: Performed by: OBSTETRICS & GYNECOLOGY

## 2018-08-26 PROCEDURE — 74011250637 HC RX REV CODE- 250/637: Performed by: OBSTETRICS & GYNECOLOGY

## 2018-08-26 PROCEDURE — 65270000029 HC RM PRIVATE

## 2018-08-26 RX ORDER — INSULIN LISPRO 100 [IU]/ML
INJECTION, SOLUTION INTRAVENOUS; SUBCUTANEOUS
Status: DISCONTINUED | OUTPATIENT
Start: 2018-08-26 | End: 2018-08-29

## 2018-08-26 RX ORDER — ACETAMINOPHEN 325 MG/1
650 TABLET ORAL
Status: DISCONTINUED | OUTPATIENT
Start: 2018-08-26 | End: 2018-08-29

## 2018-08-26 RX ORDER — SODIUM CHLORIDE 0.9 % (FLUSH) 0.9 %
5-10 SYRINGE (ML) INJECTION EVERY 8 HOURS
Status: DISCONTINUED | OUTPATIENT
Start: 2018-08-26 | End: 2018-08-29

## 2018-08-26 RX ORDER — SODIUM CHLORIDE 0.9 % (FLUSH) 0.9 %
5-10 SYRINGE (ML) INJECTION AS NEEDED
Status: DISCONTINUED | OUTPATIENT
Start: 2018-08-26 | End: 2018-08-29

## 2018-08-26 RX ADMIN — INSULIN LISPRO 2 UNITS: 100 INJECTION, SOLUTION INTRAVENOUS; SUBCUTANEOUS at 20:31

## 2018-08-26 RX ADMIN — HUMAN INSULIN 8 UNITS: 100 INJECTION, SUSPENSION SUBCUTANEOUS at 22:39

## 2018-08-26 RX ADMIN — ACETAMINOPHEN 650 MG: 325 TABLET ORAL at 20:08

## 2018-08-26 RX ADMIN — Medication 10 ML: at 19:09

## 2018-08-26 NOTE — PROGRESS NOTES
High Risk Obstetrics Progress Note    Name: Darren Kraus MRN: [id number]  SSN: [id number]    YOB: 1985  Age: 29 y.o. Sex: female      Subjective:      LOS: 1 day    Estimated Date of Delivery: 18   Gestational Age Today: 36w4d     Patient admitted for diabetes - gestational and elevated blood pressures. States she does have normal fetal movement and does not have abdominal pain  , contractions, headache  and visual disturbances. Objective:     Vitals:  Blood pressure 143/83, pulse 83, temperature 98.2 °F (36.8 °C), resp. rate 16, height 5' 6\" (1.676 m), weight 134.3 kg (296 lb), SpO2 99 %, currently breastfeeding. Temp (24hrs), Av.5 °F (36.9 °C), Min:98.2 °F (36.8 °C), Max:99 °F (55.0 °C)    Systolic (37QIT), ADRIEN:263 , Min:99 , OFK:839      Diastolic (41JPL), YDX:16, Min:58, Max:92       Intake and Output:          Physical Exam:  Patient without distress.   Abdomen: soft, nontender  Fundus: soft and non tender  Lower Extremities:  - Edema trace   - No cords or calf tenderness.   - Patellar Reflexes: 1+ bilaterally       Membranes:  Intact    Uterine Activity:  None    Fetal Heart Rate:  Reactive        Labs:   Recent Results (from the past 36 hour(s))   GLUCOSE, POC    Collection Time: 18  7:03 AM   Result Value Ref Range    Glucose (POC) 108 (H) 65 - 100 mg/dL    Performed by Ana Alberts, POC    Collection Time: 18  8:59 AM   Result Value Ref Range    Glucose (POC) 137 (H) 65 - 100 mg/dL    Performed by Leda Hinojosa    GLUCOSE, POC    Collection Time: 18  2:45 PM   Result Value Ref Range    Glucose (POC) 162 (H) 65 - 100 mg/dL    Performed by Leda Hinojosa    GLUCOSE, POC    Collection Time: 18  6:36 PM   Result Value Ref Range    Glucose (POC) 82 65 - 100 mg/dL    Performed by Leda Hinojosa    GLUCOSE, POC    Collection Time: 18  9:34 PM   Result Value Ref Range    Glucose (POC) 128 (H) 65 - 100 mg/dL    Performed by CAROLYN PINEDA) GLUCOSE, POC    Collection Time: 08/26/18  7:55 AM   Result Value Ref Range    Glucose (POC) 94 65 - 100 mg/dL    Performed by Jac Blake, POC    Collection Time: 08/26/18 11:31 AM   Result Value Ref Range    Glucose (POC) 94 65 - 100 mg/dL    Performed by Corinna Shea and Plan: 38 2/7 wk IUP with GDM, poorly controlled, now on insulin and monitoring sugars with NPH at night and sliding scale. 2. Elevated blood pressures, intermittent, labs neg for preeclampsia  3. Vulvar lesions--awaiting HSV culture  4. Recent trichomonas--awaiting GC/C probe. 5. Hx of previous c/s with prior SVDs  6. Morbid obesity. NOTE: the plan is to deliver her once HSV culture returns, if neg then will induce, if positive proceed with repeat c/s.        Active Problems:    Pregnancy (8/25/2018)       Diabetes-Gestational:  continue current mgmt    Signed By: Josafat Ortega MD     August 26, 2018

## 2018-08-26 NOTE — PROGRESS NOTES
Problem: Falls - Risk of  Goal: *Absence of Falls  Document Shai Fall Risk and appropriate interventions in the flowsheet.    Outcome: Progressing Towards Goal  Fall Risk Interventions:

## 2018-08-26 NOTE — PROGRESS NOTES
1589 - Patient in bed resting, VSS, no needs at this time. Patient reports intermittent HA over the past week as well as spots in her vision over the past week intermittently, neither currently present. 26 - Spoke with Dr. Chinyere Bowens from provider to use sliding scale correctional insulin for 1 hour post prandial glucose checks, POC glucose should be fasting, 1 hr PP and HS. MD would like RN to obtain fasting glucose at this time. 0755 - Fastin glucose = 94, MD informed. 0930 - Patient just finished eating breakfast. Will check glucose in 1 hour. 56 - primary RN asked charge RN to check glucose as primary RN was in another delivery. 1131 - 1 hour PP glucose checked as charge RN was unable to check glucose 1 hr PP, Dr. Katherine Becker made aware. [id number] - Patient called out finished eating, will check glucose 1 hour PP.     1510 - 1 hour PP glucose taken, patient sitting in bed on phone. No additional needs at this time. 1906 - Bedside and Verbal shift change report given to Kathryn Jauregui RN (oncoming nurse) by Danielle Wills RN (offgoing nurse). Report included the following information SBAR, Procedure Summary, Intake/Output, MAR, Recent Results and Med Rec Status.

## 2018-08-27 LAB
ALBUMIN SERPL-MCNC: 2.6 G/DL (ref 3.5–5)
ALBUMIN/GLOB SERPL: 0.6 {RATIO} (ref 1.1–2.2)
ALP SERPL-CCNC: 153 U/L (ref 45–117)
ALT SERPL-CCNC: 12 U/L (ref 12–78)
ANION GAP SERPL CALC-SCNC: 9 MMOL/L (ref 5–15)
AST SERPL-CCNC: 14 U/L (ref 15–37)
BILIRUB SERPL-MCNC: 0.4 MG/DL (ref 0.2–1)
BUN SERPL-MCNC: 6 MG/DL (ref 6–20)
BUN/CREAT SERPL: 8 (ref 12–20)
C TRACH DNA SPEC QL NAA+PROBE: NEGATIVE
CALCIUM SERPL-MCNC: 8.7 MG/DL (ref 8.5–10.1)
CHLORIDE SERPL-SCNC: 104 MMOL/L (ref 97–108)
CO2 SERPL-SCNC: 23 MMOL/L (ref 21–32)
CREAT SERPL-MCNC: 0.73 MG/DL (ref 0.55–1.02)
ERYTHROCYTE [DISTWIDTH] IN BLOOD BY AUTOMATED COUNT: 14.4 % (ref 11.5–14.5)
GLOBULIN SER CALC-MCNC: 4.1 G/DL (ref 2–4)
GLUCOSE BLD STRIP.AUTO-MCNC: 109 MG/DL (ref 65–100)
GLUCOSE BLD STRIP.AUTO-MCNC: 138 MG/DL (ref 65–100)
GLUCOSE BLD STRIP.AUTO-MCNC: 143 MG/DL (ref 65–100)
GLUCOSE BLD STRIP.AUTO-MCNC: 143 MG/DL (ref 65–100)
GLUCOSE BLD STRIP.AUTO-MCNC: 93 MG/DL (ref 65–100)
GLUCOSE SERPL-MCNC: 91 MG/DL (ref 65–100)
HCT VFR BLD AUTO: 33.5 % (ref 35–47)
HGB BLD-MCNC: 11.1 G/DL (ref 11.5–16)
HSV SPEC CULT: NORMAL
MCH RBC QN AUTO: 28 PG (ref 26–34)
MCHC RBC AUTO-ENTMCNC: 33.1 G/DL (ref 30–36.5)
MCV RBC AUTO: 84.6 FL (ref 80–99)
N GONORRHOEA DNA SPEC QL NAA+PROBE: NEGATIVE
NRBC # BLD: 0 K/UL (ref 0–0.01)
NRBC BLD-RTO: 0 PER 100 WBC
PLATELET # BLD AUTO: 183 K/UL (ref 150–400)
PMV BLD AUTO: 12.3 FL (ref 8.9–12.9)
POTASSIUM SERPL-SCNC: 4 MMOL/L (ref 3.5–5.1)
PROT SERPL-MCNC: 6.7 G/DL (ref 6.4–8.2)
RBC # BLD AUTO: 3.96 M/UL (ref 3.8–5.2)
SAMPLE TYPE: NORMAL
SERVICE CMNT-IMP: ABNORMAL
SERVICE CMNT-IMP: NORMAL
SERVICE CMNT-IMP: NORMAL
SODIUM SERPL-SCNC: 136 MMOL/L (ref 136–145)
SPECIMEN SOURCE: NORMAL
SPECIMEN SOURCE: NORMAL
WBC # BLD AUTO: 10.5 K/UL (ref 3.6–11)

## 2018-08-27 PROCEDURE — 74011250637 HC RX REV CODE- 250/637: Performed by: OBSTETRICS & GYNECOLOGY

## 2018-08-27 PROCEDURE — 82962 GLUCOSE BLOOD TEST: CPT

## 2018-08-27 PROCEDURE — 75410000002 HC LABOR FEE PER 1 HR: Performed by: OBSTETRICS & GYNECOLOGY

## 2018-08-27 PROCEDURE — 86696 HERPES SIMPLEX TYPE 2 TEST: CPT | Performed by: STUDENT IN AN ORGANIZED HEALTH CARE EDUCATION/TRAINING PROGRAM

## 2018-08-27 PROCEDURE — 80053 COMPREHEN METABOLIC PANEL: CPT | Performed by: STUDENT IN AN ORGANIZED HEALTH CARE EDUCATION/TRAINING PROGRAM

## 2018-08-27 PROCEDURE — 86695 HERPES SIMPLEX TYPE 1 TEST: CPT | Performed by: STUDENT IN AN ORGANIZED HEALTH CARE EDUCATION/TRAINING PROGRAM

## 2018-08-27 PROCEDURE — 85027 COMPLETE CBC AUTOMATED: CPT | Performed by: STUDENT IN AN ORGANIZED HEALTH CARE EDUCATION/TRAINING PROGRAM

## 2018-08-27 PROCEDURE — 74011636637 HC RX REV CODE- 636/637: Performed by: OBSTETRICS & GYNECOLOGY

## 2018-08-27 PROCEDURE — 36415 COLL VENOUS BLD VENIPUNCTURE: CPT | Performed by: STUDENT IN AN ORGANIZED HEALTH CARE EDUCATION/TRAINING PROGRAM

## 2018-08-27 PROCEDURE — 65270000029 HC RM PRIVATE

## 2018-08-27 RX ADMIN — HUMAN INSULIN 8 UNITS: 100 INJECTION, SUSPENSION SUBCUTANEOUS at 22:16

## 2018-08-27 RX ADMIN — Medication 10 ML: at 14:44

## 2018-08-27 RX ADMIN — INSULIN LISPRO 2 UNITS: 100 INJECTION, SOLUTION INTRAVENOUS; SUBCUTANEOUS at 14:43

## 2018-08-27 RX ADMIN — ACETAMINOPHEN 650 MG: 325 TABLET ORAL at 19:15

## 2018-08-27 RX ADMIN — INSULIN LISPRO 2 UNITS: 100 INJECTION, SOLUTION INTRAVENOUS; SUBCUTANEOUS at 19:20

## 2018-08-27 RX ADMIN — Medication 10 ML: at 08:45

## 2018-08-27 NOTE — PROGRESS NOTES
Problem: Falls - Risk of  Goal: *Absence of Falls  Document Shai Fall Risk and appropriate interventions in the flowsheet.    Outcome: Progressing Towards Goal  Fall Risk Interventions:            Medication Interventions: Teach patient to arise slowly

## 2018-08-27 NOTE — PROGRESS NOTES
High Risk Obstetrics Progress Note    Name: Curt Chadwick MRN: [id number]  SSN: [id number]    YOB: 1985  Age: 29 y.o. Sex: female      Subjective:      LOS: 3 days    Estimated Date of Delivery: 18   Gestational Age Today: 36w4d     Patient admitted for diabetes - gestational and elevated blood pressures. States she does have normal fetal movement and does not have abdominal pain  , contractions, headache  and visual disturbances. Objective:     Vitals:  Blood pressure 138/79, pulse 92, temperature 98.6 °F (37 °C), resp. rate 14, height 5' 6\" (1.676 m), weight 134.3 kg (296 lb), SpO2 99 %, currently breastfeeding. Temp (24hrs), Av.5 °F (36.9 °C), Min:98.3 °F (36.8 °C), Max:98.6 °F (37 °C)    Systolic (85YQE), YTH:216 , Min:130 , BJZ:945      Diastolic (82DYV), ZTA:71, Min:67, Max:79       Intake and Output:          Physical Exam:  Patient without distress.   Abdomen: soft, nontender  Fundus: soft and non tender  Lower Extremities:  - Edema trace   - No cords or calf tenderness.   - Patellar Reflexes: 1+ bilaterally       Membranes:  Intact    Uterine Activity:  None    Fetal Heart Rate:  Reactive        Labs:   Recent Results (from the past 36 hour(s))   GLUCOSE, POC    Collection Time: 18  9:34 PM   Result Value Ref Range    Glucose (POC) 128 (H) 65 - 100 mg/dL    Performed by CAROLYN PINEDA)    GLUCOSE, POC    Collection Time: 18  7:55 AM   Result Value Ref Range    Glucose (POC) 94 65 - 100 mg/dL    Performed by Seng Case, POC    Collection Time: 18 11:31 AM   Result Value Ref Range    Glucose (POC) 94 65 - 100 mg/dL    Performed by Seng Case, POC    Collection Time: 18  3:08 PM   Result Value Ref Range    Glucose (POC) 138 (H) 65 - 100 mg/dL    Performed by Seng Case, POC    Collection Time: 18  8:26 PM   Result Value Ref Range    Glucose (POC) 157 (H) 65 - 100 mg/dL    Performed by Katty LANDEROS, POC    Collection Time: 08/26/18 10:38 PM   Result Value Ref Range    Glucose (POC) 118 (H) 65 - 100 mg/dL    Performed by Margarita Felix    GLUCOSE, POC    Collection Time: 08/27/18  7:19 AM   Result Value Ref Range    Glucose (POC) 93 65 - 100 mg/dL    Performed by Ana Amaral and Plan: 38 2/7 wk IUP with GDM, poorly controlled, now on insulin and monitoring sugars with NPH at night and sliding scale. 2. Pre-E without severe features (mild range BPs, 24hr urine protein 400mg)  3. Vulvar lesions--awaiting HSV culture  4. Recent trichomonas--awaiting GC/C probe. 5. Hx of previous c/s with prior SVDs  6. Morbid obesity. NOTE: the plan is to deliver her once HSV culture returns, if neg then will induce, if positive proceed with repeat c/s. Active Problems:    Pregnancy (8/25/2018)    Repeat CBC and CMP today   Awaiting HSV culture --> then will finalize delivery plan     Signed By: Miriam Varghese DO     August 27, 2018           ADDENDUM 1300     Pt's perineum examined with Nickie Mejía RN. Small ~2mm slit-like area on posterior forchette, this area is not painful to touch. Otherwise no lesions, redness or pain. Low suspicion for active HSV. HSV culture should result tomorrow. Plan pending results. Pt desires TOLAC. HSV IgG/IgM sent as well.      Miriam Varghese DO

## 2018-08-27 NOTE — PROGRESS NOTES
4150 - Assumed care of patient at this time, patient sleeping. 7327 - Patient up at this time, VSS, no needs at this time. Patient denies any HA or blurry vision this AM or overnight. Patient writing output on board still, water jug refilled. 7526 - Patient sitting up eating breakfast.     0915 - Patient finished eating breakfast.     1000 - Patient up in shower at this time, linens changed, egg crate placed on bed, patient given new gown. 1030 - Patient finished in shower, glucose = 138.     1130 - Placed on EFM. BP taken = 136/94 with patient sitting in bed. Will continue to monitor. 1151 - Accelerations noted when placed on EFM but variability is now decreased, patient turned on left side and remote placed on abdomen for acoustic stimulation. 1215 - Reactive NST observed, EFM DC.  at bedside at this time. 1320 - Spoke with lab regarding outstanding culture results, Othella Screen in the lab stated HSV culture takes 4 days to result and if it's negative it takes an additional 4 days to finalize. 12 - Dr. Nyla Mayfield at bedside assessing vagina and labia for any lesion. Jeramy Elizondo from Dr. Nyla Mayfield to draw HSV 1/2 AB, IGG/IGM. Called lab, stated HSV blood work will result this afternoon after it is sent to [facility].     1337 - Lab called RN and stated confusion with early call and the results take 4 days to come back on HSV blood work. 1905 - Bedside and Verbal shift change report given to Darrell Joy RN (oncoming nurse) by Gregory Vidal RN (offgoing nurse). Report included the following information SBAR, Intake/Output, MAR, Recent Results and Med Rec Status.

## 2018-08-27 NOTE — PROGRESS NOTES
1900 SBAR bedside report from [id number]. Care of patient assumed at this time  1910 POC discussed with patient, desires to do NST at this time. Aware of 1 hour post prandial BS checks and will call RN when it's time for this. 6:49 AM SBAR bedside report [id number].  Care of patient released at this time

## 2018-08-27 NOTE — PROGRESS NOTES
18 12:33 PM  CM met with patient to complete initial assessment and to begin discharge planning. Demographics were reviewed and confirmed. Patient noted that she lives alone with her children, who are 15, 10, 6, and 2. Patient's mother, Tena Richardson ([phone]) lives close in [place] and is her main support. Patient's boyfriend/FOB is Reginaldo Frost and he lives in [facility]. He works at [employer] and will have 2 days away from work following delivery. Patient does not work outside the home. Patient plans to breast and bottle feed and will get a pump from [facility]. [facility] will provide medical follow up for the baby. Patient has car seat, crib, clothing, and other necessary supplies. Patient has [facility] and Medicaid services in place; her next [facility] appointment is scheduled for , but she knows to call and reschedule this appointment for a closer date following delivery. Confirmed that she has the phone number to make this phone call and made her aware that CM remains available if she has trouble scheduling. Postpartum supports will include patient's mother, her 3 sisters, and FOB. Plan for delivery will be made once HSV cultures result. If cultures are positive, a next day  will likely be scheduled. If negative, plan will be made for IOL and vaginal delivery. Patient understands plan at this time, she noted that she has a \"bad\" yeast infection that caused her to \"scratch a lot\" and she feels like the lesions are a result of the scratching. She had no questions or further concerns at this time. Care Management Interventions  PCP Verified by CM:  Yes  Mode of Transport at Discharge: Self  Transition of Care Consult (CM Consult): Discharge Planning  Current Support Network: Own Home, Family Lives Nearby  Confirm Follow Up Transport: Family  Plan discussed with Pt/Family/Caregiver: Yes  Discharge Location  Discharge Placement: Home with family assistance  HERMAN Rivera

## 2018-08-28 ENCOUNTER — ANESTHESIA EVENT (OUTPATIENT)
Dept: LABOR AND DELIVERY | Age: 33
DRG: 560 | End: 2018-08-28
Payer: MEDICAID

## 2018-08-28 ENCOUNTER — ANESTHESIA (OUTPATIENT)
Dept: LABOR AND DELIVERY | Age: 33
DRG: 560 | End: 2018-08-28
Payer: MEDICAID

## 2018-08-28 LAB
GLUCOSE BLD STRIP.AUTO-MCNC: 112 MG/DL (ref 65–100)
GLUCOSE BLD STRIP.AUTO-MCNC: 127 MG/DL (ref 65–100)
GLUCOSE BLD STRIP.AUTO-MCNC: 73 MG/DL (ref 65–100)
GLUCOSE BLD STRIP.AUTO-MCNC: 86 MG/DL (ref 65–100)
GLUCOSE BLD STRIP.AUTO-MCNC: 93 MG/DL (ref 65–100)
SERVICE CMNT-IMP: ABNORMAL
SERVICE CMNT-IMP: ABNORMAL
SERVICE CMNT-IMP: NORMAL

## 2018-08-28 PROCEDURE — 74011250636 HC RX REV CODE- 250/636: Performed by: ANESTHESIOLOGY

## 2018-08-28 PROCEDURE — 77030034850

## 2018-08-28 PROCEDURE — 77030010848 HC CATH INTUTR PRSS KOLB -B

## 2018-08-28 PROCEDURE — 10907ZC DRAINAGE OF AMNIOTIC FLUID, THERAPEUTIC FROM PRODUCTS OF CONCEPTION, VIA NATURAL OR ARTIFICIAL OPENING: ICD-10-PCS | Performed by: OBSTETRICS & GYNECOLOGY

## 2018-08-28 PROCEDURE — 74011000258 HC RX REV CODE- 258: Performed by: OBSTETRICS & GYNECOLOGY

## 2018-08-28 PROCEDURE — 77030014125 HC TY EPDRL BBMI -B: Performed by: ANESTHESIOLOGY

## 2018-08-28 PROCEDURE — 82962 GLUCOSE BLOOD TEST: CPT

## 2018-08-28 PROCEDURE — 74011250636 HC RX REV CODE- 250/636: Performed by: OBSTETRICS & GYNECOLOGY

## 2018-08-28 PROCEDURE — 74011250636 HC RX REV CODE- 250/636

## 2018-08-28 PROCEDURE — 4A1H74Z MONITORING OF PRODUCTS OF CONCEPTION, CARDIAC ELECTRICAL ACTIVITY, VIA NATURAL OR ARTIFICIAL OPENING: ICD-10-PCS | Performed by: OBSTETRICS & GYNECOLOGY

## 2018-08-28 PROCEDURE — 77030018836 HC SOL IRR NACL ICUM -A

## 2018-08-28 PROCEDURE — 77030018749 HC HK AMNIO DISP DERY -A

## 2018-08-28 PROCEDURE — 65270000029 HC RM PRIVATE

## 2018-08-28 PROCEDURE — 77030008459 HC STPLR SKN COOP -B

## 2018-08-28 PROCEDURE — 3E033VJ INTRODUCTION OF OTHER HORMONE INTO PERIPHERAL VEIN, PERCUTANEOUS APPROACH: ICD-10-PCS | Performed by: OBSTETRICS & GYNECOLOGY

## 2018-08-28 PROCEDURE — 74011250637 HC RX REV CODE- 250/637: Performed by: OBSTETRICS & GYNECOLOGY

## 2018-08-28 PROCEDURE — 77030032490 HC SLV COMPR SCD KNE COVD -B

## 2018-08-28 PROCEDURE — 75410000002 HC LABOR FEE PER 1 HR: Performed by: OBSTETRICS & GYNECOLOGY

## 2018-08-28 RX ORDER — LIDOCAINE HYDROCHLORIDE AND EPINEPHRINE 15; 5 MG/ML; UG/ML
INJECTION, SOLUTION EPIDURAL AS NEEDED
Status: DISCONTINUED | OUTPATIENT
Start: 2018-08-28 | End: 2018-08-29 | Stop reason: HOSPADM

## 2018-08-28 RX ORDER — EPHEDRINE SULFATE 50 MG/ML
10 INJECTION, SOLUTION INTRAVENOUS
Status: DISCONTINUED | OUTPATIENT
Start: 2018-08-28 | End: 2018-08-29

## 2018-08-28 RX ORDER — SODIUM CHLORIDE, SODIUM LACTATE, POTASSIUM CHLORIDE, CALCIUM CHLORIDE 600; 310; 30; 20 MG/100ML; MG/100ML; MG/100ML; MG/100ML
125 INJECTION, SOLUTION INTRAVENOUS CONTINUOUS
Status: DISCONTINUED | OUTPATIENT
Start: 2018-08-28 | End: 2018-08-29

## 2018-08-28 RX ORDER — FENTANYL CITRATE 50 UG/ML
INJECTION, SOLUTION INTRAMUSCULAR; INTRAVENOUS AS NEEDED
Status: DISCONTINUED | OUTPATIENT
Start: 2018-08-28 | End: 2018-08-29 | Stop reason: HOSPADM

## 2018-08-28 RX ORDER — FENTANYL/BUPIVACAINE/NS/PF 2-1250MCG
10 PREFILLED PUMP RESERVOIR EPIDURAL CONTINUOUS
Status: DISCONTINUED | OUTPATIENT
Start: 2018-08-28 | End: 2018-08-29

## 2018-08-28 RX ORDER — OXYTOCIN IN 5 % DEXTROSE 30/500 ML
2 PLASTIC BAG, INJECTION (ML) INTRAVENOUS
Status: DISCONTINUED | OUTPATIENT
Start: 2018-08-28 | End: 2018-08-29

## 2018-08-28 RX ORDER — NALBUPHINE HYDROCHLORIDE 10 MG/ML
5 INJECTION, SOLUTION INTRAMUSCULAR; INTRAVENOUS; SUBCUTANEOUS ONCE
Status: COMPLETED | OUTPATIENT
Start: 2018-08-28 | End: 2018-08-28

## 2018-08-28 RX ORDER — NALBUPHINE HYDROCHLORIDE 10 MG/ML
10 INJECTION, SOLUTION INTRAMUSCULAR; INTRAVENOUS; SUBCUTANEOUS
Status: DISCONTINUED | OUTPATIENT
Start: 2018-08-28 | End: 2018-08-29

## 2018-08-28 RX ADMIN — LIDOCAINE HYDROCHLORIDE AND EPINEPHRINE 3.5 ML: 15; 5 INJECTION, SOLUTION EPIDURAL at 19:41

## 2018-08-28 RX ADMIN — PROMETHAZINE HYDROCHLORIDE 12.5 MG: 25 INJECTION INTRAMUSCULAR; INTRAVENOUS at 16:18

## 2018-08-28 RX ADMIN — SODIUM CHLORIDE, SODIUM LACTATE, POTASSIUM CHLORIDE, AND CALCIUM CHLORIDE 999 ML/HR: 600; 310; 30; 20 INJECTION, SOLUTION INTRAVENOUS at 18:11

## 2018-08-28 RX ADMIN — PENICILLIN G POTASSIUM 2.5 MILLION UNITS: 20000000 POWDER, FOR SOLUTION INTRAVENOUS at 22:21

## 2018-08-28 RX ADMIN — FENTANYL CITRATE 75 MCG: 50 INJECTION, SOLUTION INTRAMUSCULAR; INTRAVENOUS at 19:41

## 2018-08-28 RX ADMIN — Medication 10 ML/HR: at 20:20

## 2018-08-28 RX ADMIN — NALBUPHINE HYDROCHLORIDE 5 MG: 10 INJECTION, SOLUTION INTRAMUSCULAR; INTRAVENOUS; SUBCUTANEOUS at 16:15

## 2018-08-28 RX ADMIN — SODIUM CHLORIDE 5 MILLION UNITS: 900 INJECTION, SOLUTION INTRAVENOUS at 13:14

## 2018-08-28 RX ADMIN — Medication 2 MILLI-UNITS/MIN: at 09:35

## 2018-08-28 RX ADMIN — Medication 10 ML: at 06:00

## 2018-08-28 RX ADMIN — ACETAMINOPHEN 650 MG: 325 TABLET ORAL at 07:58

## 2018-08-28 RX ADMIN — SODIUM CHLORIDE, SODIUM LACTATE, POTASSIUM CHLORIDE, AND CALCIUM CHLORIDE 125 ML/HR: 600; 310; 30; 20 INJECTION, SOLUTION INTRAVENOUS at 08:50

## 2018-08-28 RX ADMIN — SODIUM CHLORIDE, SODIUM LACTATE, POTASSIUM CHLORIDE, AND CALCIUM CHLORIDE 999 ML/HR: 600; 310; 30; 20 INJECTION, SOLUTION INTRAVENOUS at 19:12

## 2018-08-28 RX ADMIN — FENTANYL CITRATE 25 MCG: 50 INJECTION, SOLUTION INTRAMUSCULAR; INTRAVENOUS at 19:34

## 2018-08-28 RX ADMIN — PENICILLIN G POTASSIUM 2.5 MILLION UNITS: 20000000 POWDER, FOR SOLUTION INTRAVENOUS at 17:29

## 2018-08-28 NOTE — PROGRESS NOTES
Labor Progress Note Patient seen, fetal heart rate and contraction pattern evaluated, patient examined. Feeling more intense UCs No data found. Physical Exam: 
Cervical Exam: 1- 2 cm dilated 50% effaced   
-1 station Membranes:  Intact Uterine Activity: Frequency: Every 2-3 minutes Fetal Heart Rate: Baseline: 130 per minute Variability: moderate Accelerations: yes Decelerations: none recently Assessment/Plan: 
Reassuring fetal status, Continue plan for vaginal delivery

## 2018-08-28 NOTE — ANESTHESIA PROCEDURE NOTES
CSE Block Start time: 8/28/2018 7:29 PM 
End time: 8/28/2018 7:44 PM 
Performed by: Samira Aguilar Authorized by: Samira Aguilar Pre-Procedure Indications: procedure for pain   
preanesthetic checklist: patient identified, risks and benefits discussed, anesthesia consent, patient being monitored and timeout performed Timeout Time: 19:28 Procedure:  
Patient Position:  Supine Prep Region:  Lumbar Prep: Betadine Location:  L3-4 (Probable: Unable to palpate any landmarks) Epidural Needle:  
Needle Type:  Veatrice [place] Needle Gauge:  18 G Injection Technique:  Loss of resistance using air Attempts:  1 Spinal Needle:  
Needle Type: Denisha Needle Gauge:  27 G Catheter:  
Catheter Type:  Standard Catheter Size:  20 G Catheter at Skin Depth (cm):  12.5 Depth in Epidural Space (cm):  2.5 Events: no blood with aspiration, no cerebrospinal fluid with aspiration, no paresthesia and negative aspiration test   
Test Dose:  Lidocaine 1.5% w/ epi and negative Assessment:  
Catheter Secured:  Tegaderm and tape Insertion:  Uncomplicated Patient tolerance:  Patient tolerated the procedure well with no immediate complications

## 2018-08-28 NOTE — PROGRESS NOTES
08/28/18 
 
7:10 PM 
Bedside shift change report given to Debyb Peraza RN (oncoming nurse) by Saloni Medina RN (offgoing nurse). Report given with SBAR, Kardex, MAR, Recent Results and Med Rec Status. Assumed care of pt. Introduced self as Primary RN. Bed locked and in low position with call bell within reach. Plan discussed with pt and understanding was verbalized. Pt denies additional complaints at this time. White board updated with this nurse's name. Patient advised that medical information will be discussed and it is their own reasonability to tell nurse if such conversation should not take place in the presence of visitors. Pt verbalizes understanding. Kimberly Londono, KEZIA 
 
7:28 PM 
Dr Vi Coburn at bedside for epidural placement. Time out performed. 8:15 PM 
Dr Anne Leggett at bedside for SVE/ AROM/ FSE/ & IUPC placement. MVU's to 200.  
 
9:00 PM 
Peanut ball placed 9:58 PM 
Patient called out reporting pressure. SVE performed and patient cervix is posterior, unable to reach. Will inform Dr Anne Leggett. 10:27 PM 
MVU's reviewed with Dr Anne Leggett. Continue with pitocin. 12:38 AM 
Side lying hip release performed on both side thru two contractions on each side. Patient then turned onto L side with R leg into stirrup and placed in T-Lea.  
 
4:20 AM 
Called to patient room for reports of pain/pressure. SVE performed 8/90/0. Patient remains uncomfortable with exam and unable to check cervix while chago. Epidural bolus given without relief. Dr Vi Coburn called to bedside. Pitocin decreased from 18 to 12.  
 
4:35 AM 
Dr Vi Coburn at bedside for bolus. 5:10 AM 
Patient complete. 5:12 AM 
Dr Anne Leggett called for delivery. 5:15 AM 
Dr Anne Leggett arrived for delivery. 5:20 AM 
Patient began pushing 5:21 AM 
Birth of viable female infant.   
 
6:55 AM 
Bedside and Verbal shift change report given to Alexei Garcia RN (oncoming nurse) by Audra Silva RN (offgoing nurse). Report included the following information SBAR, Kardex, Intake/Output, MAR, Recent Results and Med Rec Status.

## 2018-08-28 NOTE — PROGRESS NOTES
0701 Bedside and verbal SBAR received from CALEB Hanson RN. Patient care assumed at this time. 7162 Morning assessment completed by RN. Patient states she has a headache. Will give tylenol. No other complaints. Patient will call RN when she gets her breakfast to monitor for one hour PP blood sugars. 6814 Patient began eating breakfast now. Will do BG check in one hour. [place].Heidi Patel at bedside for SVE. Closed, thick, high. Will do moy bulb this evening for induction. 5301 Dr. Dariel Patel gave orders to start pitocin now and then will insert moy balloon this evening. 4610 Bedside and Verbal shift change report given to CHIKI Feng (oncoming nurse) by Tanisha Geller RN (offgoing nurse). Report included the following information SBAR, Kardex, Intake/Output, MAR, Accordion, Recent Results and Med Rec Status.

## 2018-08-28 NOTE — PROGRESS NOTES
High Risk Obstetrics Progress Note Name: Zachary Cruz MRN: [id number]  SSN: [id number] YOB: 1985  Age: 29 y.o. Sex: female Subjective: LOS: 3 days Estimated Date of Delivery: 18 Gestational Age Today: 38w3d Patient admitted for diabetes - gestational and gestational HTN. States she does have normal fetal movement and does not have abdominal pain  , contractions and headache . Was also admitted with vulvar lesions concerning for HSV, culture returned negative. Objective:  
 
Vitals:  Blood pressure 142/73, pulse 90, temperature 98.7 °F (37.1 °C), resp. rate 16, height 5' 6\" (1.676 m), weight 134.3 kg (296 lb), SpO2 99 %, currently breastfeeding. Temp (24hrs), Av.6 °F (37 °C), Min:98.3 °F (36.8 °C), Max:98.7 °F (37.1 °C) Systolic (75KLC), KUE:535 , Min:131 , Max:144 Diastolic (34IZP), RKO:67, Min:66, Max:94 Intake and Output:    
  
 
Physical Exam: 
Patient without distress. Abdomen: soft, nontender Fundus: soft and non tender Cervical Exam: 0 cm dilated 30% effaced   
-3 station Lower Extremities:  - Edema No 
 - No cords or calf tenderness. Membranes:  Intact Uterine Activity:  None Fetal Heart Rate:  Has been reactive Labs:  
Recent Results (from the past 36 hour(s)) GLUCOSE, POC Collection Time: 18 10:38 PM  
Result Value Ref [place] Glucose (POC) 118 (H) 65 - 100 mg/dL Performed by Carmelo Chaparro GLUCOSE, POC Collection Time: 18  7:19 AM  
Result Value Ref [place] Glucose (POC) 93 65 - 100 mg/dL Performed by Mihai Castro CBC W/O DIFF Collection Time: 18  8:45 AM  
Result Value Ref [place] WBC 10.5 3.6 - 11.0 K/uL  
 RBC 3.96 3.80 - 5.20 M/uL  
 HGB 11.1 (L) 11.5 - 16.0 g/dL HCT 33.5 (L) 35.0 - 47.0 % MCV 84.6 80.0 - 99.0 FL  
 MCH 28.0 26.0 - 34.0 PG  
 MCHC 33.1 30.0 - 36.5 g/dL  
 RDW 14.4 11.5 - 14.5 % PLATELET 905 281 - 137 K/uL  MPV 12.3 8.9 - 12.9 FL  
 NRBC 0.0 0  WBC ABSOLUTE NRBC 0.00 0.00 - 0.01 K/uL METABOLIC PANEL, COMPREHENSIVE Collection Time: 08/27/18  8:45 AM  
Result Value Ref [place] Sodium 136 136 - 145 mmol/L Potassium 4.0 3.5 - 5.1 mmol/L Chloride 104 97 - 108 mmol/L  
 CO2 23 21 - 32 mmol/L Anion gap 9 5 - 15 mmol/L Glucose 91 65 - 100 mg/dL BUN 6 6 - 20 MG/DL Creatinine 0.73 0.55 - 1.02 MG/DL  
 BUN/Creatinine ratio 8 (L) 12 - 20 GFR est AA >60 >60 ml/min/1.73m2 GFR est non-AA >60 >60 ml/min/1.73m2 Calcium 8.7 8.5 - 10.1 MG/DL Bilirubin, total 0.4 0.2 - 1.0 MG/DL  
 ALT (SGPT) 12 12 - 78 U/L  
 AST (SGOT) 14 (L) 15 - 37 U/L Alk. phosphatase 153 (H) 45 - 117 U/L Protein, total 6.7 6.4 - 8.2 g/dL Albumin 2.6 (L) 3.5 - 5.0 g/dL Globulin 4.1 (H) 2.0 - 4.0 g/dL A-G Ratio 0.6 (L) 1.1 - 2.2 GLUCOSE, POC Collection Time: 08/27/18 10:32 AM  
Result Value Ref [place] Glucose (POC) 138 (H) 65 - 100 mg/dL Performed by Lisa Jennifer GLUCOSE, POC Collection Time: 08/27/18  2:37 PM  
Result Value Ref [place] Glucose (POC) 143 (H) 65 - 100 mg/dL Performed by Lisa Jennifer GLUCOSE, POC Collection Time: 08/27/18  7:16 PM  
Result Value Ref [place] Glucose (POC) 143 (H) 65 - 100 mg/dL Performed by Anders SoupQubes GLUCOSE, POC Collection Time: 08/27/18 10:17 PM  
Result Value Ref [place] Glucose (POC) 109 (H) 65 - 100 mg/dL Performed by Anders Reveles GLUCOSE, POC Collection Time: 08/28/18  6:44 AM  
Result Value Ref [place] Glucose (POC) 93 65 - 100 mg/dL Performed by Gretchen Haq Assessment and Plan: 38 4/7 wk IUP with Gestational DM--poorly controlled. Will deliver per [facility] recommendation. 2. Gestational HTN--will induce. NOTE: will start pitocin this am and if not significantly changed by this afternoon will stop, place moy and restart in am. 3. Previous c/s with prior SVDs, for TOLAC  4. Morbid obesity. Active Problems: Pregnancy (8/25/2018) Diabetes-Gestational:  as above Signed By: Danii Lock MD   
 August 28, 2018

## 2018-08-28 NOTE — PROGRESS NOTES
0846-Bedside report received from Nish Lu RN. POC discussed with pt at this time, she verbalized understanding and had no further questions or concerns. 0919-BS obtained and is 127, no insulin needed at this time. 0935-Strip is now reactive, pitocin started per MD order. 1010-Dr. Rodríguez called and updated on pt's strip. MD reported that pt had a variable deceleration with a contraction, that contraction did not  but that pt states that was the only one she has felt since pitocin being started. MD also reported that pitocin is at 4milliunits, MD states to continue increasing pitocin and to call if pt continues to have decelerations. No new orders at this time. 1250-Dr. Rodríguez on unit, writer asked MD about GBS results. MD states they came back positive, and since pt is now starting to feel her contractions he would like to start PCN. Also MD states after BS check at 1400, pt may have BS checks every 4 hours until active labor. No other orders at this time. Writer let MD know that results were needed for pt's chart. 1332-Dr. Rodríguez at bedside to recheck pt's cervix. 1530-Writer at bedside adjusting monitor. Pt is teary eyed, writer asked pt if she was okay, pt states the contractions are getting worse and she would rate her pain 7/10 on pain scale. Writer going to call Dr. Montana Abarca to give him update, see if he wants pt rechecked, and get an order for pain medication. 1531-MD would like pt rechecked and called back with cervical exam. 
1532-Writer rechecked pt, she is now 3/50/-3, Claudetta Sara calling Dr. Montana Abarca back with update on SVE. 1542-Dr. Rodríguez called and reported SVE of 3/50/-3, new order received to give pt phenergan 12.5mg IVPB and nubain 5mg for pain. MD states he will be to unit in about an hour to recheck pt and decided POC for the evening. 1658-MD at bedside to recheck pt's cervix.   No change per MD, he will speak with hospitalist on call and get back to writer about POC. 1706-MD states to get pt an epidural, place a moy catheter, then call hospitalist for cervical check. No other orders at this time. 1708-Writer to bedside to update pt on POC, IV bolus started. 1908-Bedside report given to RUPAL Austin RN.

## 2018-08-28 NOTE — ANESTHESIA PREPROCEDURE EVALUATION
Anesthetic History No history of anesthetic complications Review of Systems / Medical History Patient summary reviewed and nursing notes reviewed Pulmonary Within defined limits Neuro/Psych Within defined limits Cardiovascular Hypertension: poorly controlled Exercise tolerance: >4 METS Comments: Superimposed Pre-eclampsia GI/Hepatic/Renal 
Within defined limits Endo/Other Morbid obesity Comments: Borderline DM Other Findings Comments: Previous epidural without issue Physical Exam 
 
Airway Mallampati: IV Neck ROM: normal range of motion Cardiovascular Rhythm: regular Rate: normal 
 
 
 
 Dental 
No notable dental hx Pulmonary Breath sounds clear to auscultation Abdominal 
 
 
 
 Other Findings Anesthetic Plan ASA: 3 Anesthesia type: epidural and spinal 
 
 
 
 
 
Anesthetic plan and risks discussed with: Patient Informed consent obtained.

## 2018-08-28 NOTE — PROGRESS NOTES
Labor Progress Note Patient seen, fetal heart rate and contraction pattern evaluated, patient examined. Better after nubain No data found. Physical Exam: 
Cervical Exam:  3 cm dilated 70% effaced   
-2 station Membranes:  Intact Uterine Activity: Frequency: Every 2-4 minutes Fetal Heart Rate: Baseline: ~140 per minute Variability: moderate Accelerations: not recently. Decelerations: variable Assessment/Plan: 
Reassuring fetal status, Continue plan for vaginal delivery. Get epidural and then try amniotomy

## 2018-08-29 LAB
GLUCOSE BLD STRIP.AUTO-MCNC: 71 MG/DL (ref 65–100)
HSV-2 IGG SUPPLEMENTAL TEST: POSITIVE
HSV1 IGG SER IA-ACNC: <0.91 INDEX (ref 0–0.9)
HSV1+2 IGM SER IA-ACNC: 1.47 RATIO (ref 0–0.9)
HSV2 IGG SER IA-ACNC: 1.42 INDEX (ref 0–0.9)
SERVICE CMNT-IMP: NORMAL

## 2018-08-29 PROCEDURE — 74011000250 HC RX REV CODE- 250

## 2018-08-29 PROCEDURE — 75410000002 HC LABOR FEE PER 1 HR: Performed by: OBSTETRICS & GYNECOLOGY

## 2018-08-29 PROCEDURE — 74011250636 HC RX REV CODE- 250/636: Performed by: OBSTETRICS & GYNECOLOGY

## 2018-08-29 PROCEDURE — 82962 GLUCOSE BLOOD TEST: CPT

## 2018-08-29 PROCEDURE — 74011250637 HC RX REV CODE- 250/637: Performed by: OBSTETRICS & GYNECOLOGY

## 2018-08-29 PROCEDURE — 65270000029 HC RM PRIVATE

## 2018-08-29 PROCEDURE — 74011250636 HC RX REV CODE- 250/636: Performed by: ANESTHESIOLOGY

## 2018-08-29 RX ORDER — BUPIVACAINE HYDROCHLORIDE 2.5 MG/ML
INJECTION, SOLUTION EPIDURAL; INFILTRATION; INTRACAUDAL AS NEEDED
Status: DISCONTINUED | OUTPATIENT
Start: 2018-08-29 | End: 2018-08-29 | Stop reason: HOSPADM

## 2018-08-29 RX ORDER — ZOLPIDEM TARTRATE 5 MG/1
5 TABLET ORAL
Status: DISCONTINUED | OUTPATIENT
Start: 2018-08-29 | End: 2018-08-31 | Stop reason: HOSPADM

## 2018-08-29 RX ORDER — NALOXONE HYDROCHLORIDE 0.4 MG/ML
0.4 INJECTION, SOLUTION INTRAMUSCULAR; INTRAVENOUS; SUBCUTANEOUS AS NEEDED
Status: DISCONTINUED | OUTPATIENT
Start: 2018-08-29 | End: 2018-08-31 | Stop reason: HOSPADM

## 2018-08-29 RX ORDER — OXYCODONE AND ACETAMINOPHEN 5; 325 MG/1; MG/1
2 TABLET ORAL
Status: DISCONTINUED | OUTPATIENT
Start: 2018-08-29 | End: 2018-08-31 | Stop reason: HOSPADM

## 2018-08-29 RX ORDER — OXYTOCIN/RINGER'S LACTATE 20/1000 ML
125-500 PLASTIC BAG, INJECTION (ML) INTRAVENOUS ONCE
Status: COMPLETED | OUTPATIENT
Start: 2018-08-29 | End: 2018-08-29

## 2018-08-29 RX ORDER — HYDROCORTISONE ACETATE PRAMOXINE HCL 2.5; 1 G/100G; G/100G
CREAM TOPICAL AS NEEDED
Status: DISCONTINUED | OUTPATIENT
Start: 2018-08-29 | End: 2018-08-31 | Stop reason: HOSPADM

## 2018-08-29 RX ORDER — IBUPROFEN 800 MG/1
800 TABLET ORAL EVERY 8 HOURS
Status: DISCONTINUED | OUTPATIENT
Start: 2018-08-29 | End: 2018-08-31 | Stop reason: HOSPADM

## 2018-08-29 RX ORDER — OXYCODONE AND ACETAMINOPHEN 5; 325 MG/1; MG/1
1 TABLET ORAL
Status: DISCONTINUED | OUTPATIENT
Start: 2018-08-29 | End: 2018-08-31 | Stop reason: HOSPADM

## 2018-08-29 RX ORDER — ACETAMINOPHEN 325 MG/1
650 TABLET ORAL
Status: DISCONTINUED | OUTPATIENT
Start: 2018-08-29 | End: 2018-08-31 | Stop reason: HOSPADM

## 2018-08-29 RX ADMIN — OXYCODONE HYDROCHLORIDE AND ACETAMINOPHEN 1 TABLET: 5; 325 TABLET ORAL at 20:57

## 2018-08-29 RX ADMIN — Medication 10 ML/HR: at 03:01

## 2018-08-29 RX ADMIN — IBUPROFEN 800 MG: 800 TABLET ORAL at 15:30

## 2018-08-29 RX ADMIN — BUPIVACAINE HYDROCHLORIDE 10 ML: 2.5 INJECTION, SOLUTION EPIDURAL; INFILTRATION; INTRACAUDAL at 04:33

## 2018-08-29 RX ADMIN — PENICILLIN G POTASSIUM 2.5 MILLION UNITS: 20000000 POWDER, FOR SOLUTION INTRAVENOUS at 02:16

## 2018-08-29 RX ADMIN — IBUPROFEN 800 MG: 800 TABLET ORAL at 23:13

## 2018-08-29 RX ADMIN — Medication 10000 MILLI-UNITS/HR: at 05:57

## 2018-08-29 RX ADMIN — OXYCODONE HYDROCHLORIDE AND ACETAMINOPHEN 1 TABLET: 5; 325 TABLET ORAL at 12:31

## 2018-08-29 RX ADMIN — SODIUM CHLORIDE, SODIUM LACTATE, POTASSIUM CHLORIDE, AND CALCIUM CHLORIDE 125 ML/HR: 600; 310; 30; 20 INJECTION, SOLUTION INTRAVENOUS at 00:08

## 2018-08-29 RX ADMIN — IBUPROFEN 800 MG: 800 TABLET ORAL at 07:04

## 2018-08-29 NOTE — PROGRESS NOTES
TRANSFER - OUT REPORT: 
 
Verbal report given to RAUL Ghosh Rn(name) on Melissa Rivera  being transferred to MIU(unit) for routine progression of care Report consisted of patients Situation, Background, Assessment and  
Recommendations(SBAR). Information from the following report(s) SBAR, Intake/Output, MAR and Recent Results was reviewed with the receiving nurse. Lines:  
Peripheral IV 08/24/18 (Active) Site Assessment Clean, dry, & intact 8/29/2018  7:19 AM  
Phlebitis Assessment 0 8/29/2018  7:19 AM  
Infiltration Assessment 0 8/29/2018  7:19 AM  
Dressing Status Clean, dry, & intact 8/29/2018  7:19 AM  
Dressing Type Tape;Transparent 8/29/2018  7:19 AM  
Hub Color/Line Status Pink 8/29/2018  7:19 AM  
Action Taken Blood drawn 8/24/2018 11:14 AM  
Alcohol Cap Used Yes 8/29/2018  7:19 AM  
  
 
Opportunity for questions and clarification was provided. Patient transported with: 
 Registered Nurse Tech Bands Checked by Narda aMriano and Joann Garcia

## 2018-08-29 NOTE — L&D DELIVERY NOTE
Delivery Summary    Patient: Renea Beasley MRN: [id number]  SSN: [id number]    YOB: 1985  Age: 29 y.o. Sex: female        Labor Events:    Labor: No    Rupture Date: 2018    Rupture Time: 8:15 PM    Rupture Type: AROM    Amniotic Fluid Volume: Moderate     Amniotic Fluid Description:  Amniotic Fluid Odor: Clear    None     Induction: Oxytocin;AROM         Induction Date:        Induction Time:       Indications for Induction: Gestational Hypertension;Diabetes     Augmentation: None    Augmentation Date:      Augmentation Time:      Indications for Augmentation:      Cervical Ripening:       None    Rupture Identifier: Rupture 1     Labor complications: None     Additional complications:           Delivery Events:  Episiotomy: None    Indications for Episiotomy:      Laceration(s): None       Repaired: None     Number of Repair Packets:      Suture Type and Size: None        Estimated Blood Loss (ml):          Information for the patient's :  Christy Reyna, Female [[id number]]     Delivery Summary - Baby    Delivery Date: 2018   Delivery Time: 5:21 AM   Delivery Type: Vaginal, Spontaneous Delivery  Sex:  female  Gestational Age: 44w7d  Delivery Clinician:  Ashok Gold  Living?: Living   Delivery Location: L&D 205           APGARS  One minute Five minutes Ten minutes   Skin Color: 1    1       Heart Rate: 2   2         Reflex Irritability: 2   2         Muscle Tone: 2   2       Respiration: 1   2         Total: 8   9           Presentation: Vertex  Position: Left Occiput Anterior  Resuscitation Method:  Suctioning-bulb; Tactile Stimulation     Meconium Stained: None    Cord Information: 3 Vessels   Complications: Knot  Cord Blood Sent?:  No    Blood Gases Sent?:  No    Placenta:  Date/Time:   5:29 AM  Removal: Spontaneous      Appearance: Normal;Intact     [place] Measurements:  Birth Weight:      Birth Length:     Head Circumference:       Chest Circumference:      Abdominal Paulo:       Other Providers:   ELBERT Armijo;Nadine ORDOÑEZ ASHLEE L Obstetrician;Primary Nurse;Primary  Nurse;Charge Nurse           Cord Blood Results:  Information for the patient's :  Thierry Garcia, Female [[id number]]   No results found for: Elgie Chandler, PCTDIG, BILI, ABORHEXT, ABORH    Information for the patient's :  Thierry Garcia, Female [[id number]]   No results found for: APH, APCO2, APO2, AHCO3, ABEC, ABDC, O2ST, SITE, [place], PHI, [place], PO2I, HCO3I, SO2I, IBD     Information for the patient's :  Thierry Garcia, Female [[id number]]   No results found for: EPHV, PCO2V, PO2V, HCO3V, O2STV, EBDV

## 2018-08-29 NOTE — PROGRESS NOTES
SBAR report to Munir Hoyos RN MIU , pt aaox3 skin warm and dry resp even and unlabored , denies c/o at present  Fundus firm at umbilicus

## 2018-08-29 NOTE — PROGRESS NOTES
Bedside and Verbal shift change report given to Nunu Pope and Timbo Alva (oncoming nurse) by Edgardo Londono. Marybeth Flower (offgoing nurse). Report given with SBAR, Kardex, Intake/Output and MAR.

## 2018-08-29 NOTE — PROGRESS NOTES
Labor Progress Note Patient seen, fetal heart rate and contraction pattern evaluated, patient examined. Status post epidural.  
 
 
Physical Exam: 
Cervical Exam: 4 cm dilated , 70% effaced Membranes:  AROM Uterine Activity: Frequency: Every 2-4 minutes Fetal Heart Rate: Reactive [address] Assessment/Plan: 
Reassuring fetal status, FSE/IPUC placed. Monitor labor progress. Insert Chung pls. Discussed with pt and family, agree

## 2018-08-29 NOTE — LACTATION NOTE
This note was copied from a baby's chart. Discussed with mother her plan for feeding. Reviewed the benefits of exclusive breast milk feeding during the hospital stay. Informed her of the risks of using formula to supplement in the first few days of life as well as the benefits of successful breast milk feeding; referred her to the Breastfeeding booklet about this information. She acknowledges understanding of information reviewed and states that it is her plan to blend formula feeding with occasional breast feeds for her infant. Will support her choice and offer additional information as needed. Breast Assessment Left Breast: Large Right Breast: Large Breast- Feeding Assessment Attends Breast-Feeding Classes: No 
Breast-Feeding Experience: Yes (Blend feeds, more formula than breast per pt) Breast Trauma/Surgery: No 
Type/Quality: Poor Lactation Consultant Visits Breast-Feedings: Poor (Offering formula today) LATCH Documentation Latch:  (LC did not observe BF session due to formula feedings)

## 2018-08-30 LAB
GLUCOSE BLD STRIP.AUTO-MCNC: 98 MG/DL (ref 65–100)
SERVICE CMNT-IMP: NORMAL

## 2018-08-30 PROCEDURE — 82962 GLUCOSE BLOOD TEST: CPT

## 2018-08-30 PROCEDURE — 74011250637 HC RX REV CODE- 250/637: Performed by: OBSTETRICS & GYNECOLOGY

## 2018-08-30 PROCEDURE — 65270000029 HC RM PRIVATE

## 2018-08-30 RX ADMIN — IBUPROFEN 800 MG: 800 TABLET ORAL at 06:51

## 2018-08-30 RX ADMIN — OXYCODONE HYDROCHLORIDE AND ACETAMINOPHEN 1 TABLET: 5; 325 TABLET ORAL at 19:43

## 2018-08-30 RX ADMIN — IBUPROFEN 800 MG: 800 TABLET ORAL at 23:33

## 2018-08-30 RX ADMIN — IBUPROFEN 800 MG: 800 TABLET ORAL at 14:47

## 2018-08-30 RX ADMIN — OXYCODONE HYDROCHLORIDE AND ACETAMINOPHEN 1 TABLET: 5; 325 TABLET ORAL at 07:35

## 2018-08-30 RX ADMIN — OXYCODONE HYDROCHLORIDE AND ACETAMINOPHEN 1 TABLET: 5; 325 TABLET ORAL at 23:33

## 2018-08-30 RX ADMIN — OXYCODONE HYDROCHLORIDE AND ACETAMINOPHEN 1 TABLET: 5; 325 TABLET ORAL at 13:00

## 2018-08-30 NOTE — PROGRESS NOTES
NUTRITION  
RD Screen Pt seen for:   
 
[]  MST for   []   MD Consult   
[]        Supplements  []   PO intake check  
[]        Food Allergies  []   Food Preferences/tolerances   
[]        Rescreen  []   Education []        Diet order clarification []   Other  
[x]  LOS Nutrition Prescription: No changes or new recommendations at this time Encourage adequate intake of meals and a balanced/healthful diet. Assessment:  
Information obtained from:   Chart Patient eating well with good appetite, no N/V. Gestational course complicated by gestational DM-controlled by NPH and SSI. Post-partum BG controlled, 98-71-86. Plan for blended feeds-primarily formula. No acute nutrition concerns at this time. Patient may benefit from outpatient nutrition counseling for wt management. Cultural, Church and ethnic food preferences:  
[x]  None  
[]  Identified and addressed Diet:  Regular PO Intake: [x]           Good     []           Fair      []           Poor No data found. Wt Readings from Last 5 Encounters:  
08/24/18 134.3 kg (296 lb) ] Body mass index is 47.78 kg/(m^2). Skin: 1+ BLE edema, trace upper extremities. BM:  Unknown, active BS 
ABD: WDL,  
 
Estimated Daily Nutrition Requirements: 
Kcals/day: 2872-0524 kcal (15-20kcal/kg) Protein: 134g (1.0g/kg) Fluid:200-2700 ml (1 mL/kcal) Weight Used: 134.3kg, current wt, no pre-gravid wt recorded Weight Changes:  
[]   Loss 
[]   Gain 
[x]   Stable Nutrition Problems Identified 
[x]     None 
[]     Specified food preferences  
[]     Dislikes supplements             
[]     Allergies []     Difficulty chewing    
[]     Dentition  
[]     Nausea/Vomiting 
[]    Constipation []    Diarrhea Nutrition Diagnosis: No nutrition diagnosis identified at this time Intervention:  
[]    Obtained/adjusted food preferences/tolerances and/or snacks options []    Dislikes supplements will try a substitution []    Modify diet for food allergies []    Adjust texture due to difficulty chewing  
[]    Encourage Fresh Fruit, Activia yogurt, fluid  
[]    Educated patient [x]    Rescreen per screening protocol 
[]    Add Supplements Goal: n/a Monitoring/Evaluation:  
Education & Discharge Needs 
[] Nutrition related discharge needs addressed:  
[] Supplements (on d/c instruction &/or coupons provided) [] Education  
[x] No nutrition related discharge needs at this time Rescreen: []  At Nutrition Risk  
       [x]  Not at Nutrition Risk, rescreen per screening protocol Connie Paul RD Pager [phone] Office [phone]

## 2018-08-30 NOTE — ADT AUTH CERT NOTES
Patient Demographics     
  Patient Name [address] Sex  Address Phone    
  Delonte Esquivel [phone] Female 1985 [address] 
[place] [address] [phone] (Home) [phone] (Mobile)    
   
  CSN:    
  [id number]    
   
  Admit Date: Admit Time Room Bed    
  Aug 24, 2018 10:19  [29833] 01 [[id number]]    
   
  Attending Providers     
  Provider Pager From To    
  Latanya Pennington MD  18 MOM ADM  DEL  Delivery Date: 2018 Delivery Time: 5:21 AM  
Delivery Type: , Spontaneous Sex:  female Gestational Age: 44w7d 
  
[place] Measurements: 
Birth Weight: 3.085 kg   
Birth Length: 19\"   
  
  
Delivery Clinician:  Donal Hogue?:  
Delivery Location: L&D

## 2018-08-30 NOTE — PROGRESS NOTES
Pt requesting transportation for tomorrow's discharge. Will pass on to nightshift that case management should assist with this procedure.

## 2018-08-30 NOTE — ROUTINE PROCESS
Bedside and Verbal shift change report given to [address] [place] [address] (oncoming nurse) by BLAIR Walters RN (offgoing nurse). Report included the following information SBAR, Kardex, Procedure Summary, Intake/Output, MAR and Recent Results.

## 2018-08-30 NOTE — PROGRESS NOTES
Post-Partum Day Number 1 Progress Note Tice Patron Information for the patient's :  Shirin Bull, Female [[id number]] , Spontaneous Patient doing well without significant complaint. Voiding without difficulty, normal lochia. Tolerating diet without nausea or vomiting. Pain controlled with oral medications. Vitals: 
Visit Vitals  /83 (BP 1 Location: Right arm, BP Patient Position: At rest)  Pulse 88  Temp 97.9 °F (36.6 °C)  Resp 16  
 Ht 5' 6\" (1.676 m)  Wt 134.3 kg (296 lb)  SpO2 99%  Breastfeeding Yes  BMI 47.78 kg/m2 Temp (24hrs), Av.3 °F (36.8 °C), Min:97.9 °F (36.6 °C), Max:98.6 °F (37 °C) Exam:   Patient without distress. FF @ U-2 NT 
              LE NT w/o edema Labs:  
 
Lab Results Component Value Date/Time WBC 10.5 2018 08:45 AM  
 WBC 10.9 2018 10:44 AM  
 HGB 11.1 (L) 2018 08:45 AM  
 HGB 10.8 (L) 2018 10:44 AM  
 HCT 33.5 (L) 2018 08:45 AM  
 HCT 32.8 (L) 2018 10:44 AM  
 PLATELET 307  08:45 AM  
 PLATELET 440  10:44 AM  
 
Lab Results Component Value Date/Time  
 Rubella, External Immune 2018 HBsAg, External Negative 2018 HIV, External Negative 2018 RPR, External Reactive 2018 Gonorrhea, External Negative 2018 Chlamydia, External Negative 2018 Information for the patient's :  Shirin Bull, Female [[id number]] One Minute Apgar: 8 (Filed from Delivery Summary) Five  Minute Apgar: 9 (Filed from Delivery Summary) Recent Results (from the past 24 hour(s)) GLUCOSE, POC Collection Time: 18  6:45 AM  
Result Value Ref [place] Glucose (POC) 98 65 - 100 mg/dL Performed by Tiera Song Assessment:   PPD 1 s/p , Doing well and stable Plan: 1. Continue routine postpartum care 2. Medical problems:  Morbid obesity, GDM vs Type 2 DM, HTN  
 
Humberto Huerta, DO 
 8/30/2018 
7:53 AM

## 2018-08-30 NOTE — ROUTINE PROCESS
Bedside shift change report given to Wesly (oncoming nurse) by Melinda Ayon RN (offgoing nurse). Report included the following information SBAR, Kardex, MAR and Recent Results.

## 2018-08-31 VITALS
WEIGHT: 293 LBS | BODY MASS INDEX: 47.09 KG/M2 | SYSTOLIC BLOOD PRESSURE: 142 MMHG | OXYGEN SATURATION: 99 % | HEIGHT: 66 IN | HEART RATE: 83 BPM | RESPIRATION RATE: 16 BRPM | TEMPERATURE: 98.5 F | DIASTOLIC BLOOD PRESSURE: 84 MMHG

## 2018-08-31 PROCEDURE — 74011250637 HC RX REV CODE- 250/637: Performed by: OBSTETRICS & GYNECOLOGY

## 2018-08-31 RX ORDER — OXYCODONE AND ACETAMINOPHEN 5; 325 MG/1; MG/1
1 TABLET ORAL
Qty: 10 TAB | Refills: 0 | Status: SHIPPED | OUTPATIENT
Start: 2018-08-31

## 2018-08-31 RX ADMIN — IBUPROFEN 800 MG: 800 TABLET ORAL at 14:42

## 2018-08-31 RX ADMIN — OXYCODONE HYDROCHLORIDE AND ACETAMINOPHEN 1 TABLET: 5; 325 TABLET ORAL at 03:35

## 2018-08-31 RX ADMIN — OXYCODONE HYDROCHLORIDE AND ACETAMINOPHEN 1 TABLET: 5; 325 TABLET ORAL at 14:42

## 2018-08-31 RX ADMIN — IBUPROFEN 800 MG: 800 TABLET ORAL at 07:47

## 2018-08-31 RX ADMIN — OXYCODONE HYDROCHLORIDE AND ACETAMINOPHEN 1 TABLET: 5; 325 TABLET ORAL at 07:47

## 2018-08-31 NOTE — DISCHARGE SUMMARY
Obstetrical Discharge Summary     Name: Stewart Londono MRN: [id number]  SSN: [id number]    YOB: 1985  Age: 29 y.o. Sex: female      Allergies: Review of patient's allergies indicates no known allergies. Admit Date: 2018    Discharge Date: 2018     Admitting Physician: Kaley Rosa MD     Attending Physician:  Kaley Rosa MD     * Admission Diagnoses: Pregnancy  Pregnancy    * Discharge Diagnoses:   Information for the patient's :  Leandra Tubbs Female [[id number]]   Delivery of a 3.085 kg female infant via [address], Spontaneous on 2018 at 5:21 AM  by Ke. Apgars were 8 and 9. Additional Diagnoses:   Hospital Problems as of 2018  Never Reviewed          Codes Class Noted - Resolved POA    Pregnancy ICD-10-CM: Z34.90  ICD-9-CM: V22.2  2018 - Present Unknown             Lab Results   Component Value Date/Time    Rubella, External Immune 2018    ABO,Rh O positive  2018    There is no immunization history for the selected administration types on file for this patient. * Procedures:   IOL        [place]  Depression Scale  I have been able to laugh and see the funny side of things: As much as I always could  I have looked forward with enjoyment to things: As much as I ever did  I have blamed myself unnecessarily when things went wrong: Not very often  I have been anxious or worried for no good reason: Hardly ever  I have felt scared or panicky for no very good reason: No, not at all  Things have been getting on top of me: No, I have been coping as well as ever  I have been so unhappy that I have had difficulty sleeping: No, not at all  I have felt sad or miserable: No, not at all  I have been so unhappy that I have been crying: No, never  The thought of harming myself has occurred to me: Never  Total Score: 2    * Discharge Condition: good and improved    * Hospital Course: Normal hospital course following the delivery.     * Disposition: Home    Discharge Medications: There are no discharge medications for this patient. * Follow-up Care/Patient Instructions:   Activity: Activity as tolerated  Diet: Regular Diet  Wound Care: As directed    Follow-up Information     None           Signed By:  Marimar Bennett MD     August 31, 2018

## 2018-08-31 NOTE — ROUTINE PROCESS
Bedside and Verbal shift change report given to Reed Cardenas RN (oncoming nurse) by Donal Jarvis RN (offgoing nurse). Report included the following information SBAR, Kardex, Intake/Output and MAR.

## 2018-08-31 NOTE — PROGRESS NOTES
PostPartum Note Trinity Norman 
[id number] 
1985 
[facility] y.o. 
 
S:  Ms. Trinity Norman is a [address] y.o.  PPD #2 s/p  @ 38w5d. Doing well. She had a baby girl. Her lochia is like a period. She describes her pain as mild and is well controlled with PO medications. She is breast feeding and this is going well. She is ambulating and voiding. Tolerating PO intake. O:  
Visit Vitals  /80 (BP Patient Position: At rest)  Pulse 92  Temp 98.4 °F (36.9 °C)  Resp 17  Ht 5' 6\" (1.676 m)  Wt 134.3 kg (296 lb)  SpO2 99%  Breastfeeding Yes  BMI 47.78 kg/m2 Lab Results Component Value Date/Time WBC 10.5 2018 08:45 AM  
 HGB 11.1 (L) 2018 08:45 AM  
 HCT 33.5 (L) 2018 08:45 AM  
 PLATELET 881  08:45 AM  
 MCV 84.6 2018 08:45 AM  
 
Lab Results Component Value Date/Time Glucose 91 2018 08:45 AM  
 Glucose (POC) 98 2018 06:45 AM  
 
 
Gen - No acute distress Abdomen - Fundus firm, below the umbilicus Ext - Warm, well perfused. Nontender A/P:  PPD #2 s/p  @ 38w5d doing well. 1.  Routine PP instructions/ care discussed 2. Blood type - Rh = 
3. Will continue BP monitoring and glucose monitoring. Will f/u in office in 1 week for a BP check 4. Circumcision n/a   
5. Discharge today 6. F/U 4-6 weeks for PP check. Helena Enriquez MD 
[facility]

## 2018-08-31 NOTE — PROGRESS NOTES
1845: Patient discharged to home. Discharge instructions and education completed and patient reported she had no more questions. Bands verified on patient and infant, see footprint sheet. Infant placed in car seat by parent. Patient left in [place] cab with cab voucher provided by nursing supervisor. Prescriptions: Percocet

## 2018-08-31 NOTE — DISCHARGE INSTRUCTIONS
Discharge Instructions for Vaginal Delivery    Patient ID:  Fernando Michael  [id number]  29 y.o.  1985    Take Home Medications       Continue taking your prenatal vitamins if you are breastfeeding. Follow-up care is a key part of your treatment and safety. Please schedule and keep appointments. Follow-up with your primary OB in 6 weeks. Activity  Avoid anything in your vagina for 6 weeks (no intercourse, tampons, or douching). You may drive unless you are taking prescription pain medications. Climbing stairs and light lifting are okay. Please avoid excessive exercise, though walking is okay- you'll be tired! Diet  Regular diet as tolerated. Be sure to drink plenty of fluids if you are breastfeeding. Wound care  If you have stitches, continue to rinse with a squirt bottle of warm water each time you void for about 7-10 days. .  Your stitches will gradually dissolve over four to eight weeks. Sitz baths are also helpful to keep the wound clean, encourage healing, and to help with pain associated with the stitches or hemorrhoids. You can use either a sitz bath basin or a bathtub filled with 2-3\" inches of plain warm water. Soak for 10 minutes 3 times a day as tolerated. Pain Management  1. Over the counter medications such as Tylenol and ibuprofen (Motrin or Advil) are ideal.  These may be taken together, alternating doses. You may  take the maximum dose:  Motrin or Advil (generic ibuprofen), either 3 tablets every 6 hours or 4 tablets every 8 hours or Tylenol (acetominophen) 1000mg every 6 hours (equivalent to 2 extra strength Tylenol). 2. You may also have a precrescription for stronger pain medication. Take only as needed and transition to over the counter medication in the next few days. Minimize amounts of the prescription medication, as it can be habit-forming and will worsen or cause constipation.  Most patients will find that within a couple of days, their pain is adequately controlled using only over-the-counter medications. 3. The prescription pain medication is mixed with Tylenol, therefore, you should not take any extra Tylenol or acetaminophen until you have reduced your prescription pain medication. 4. Add heating pad or sitz baths as needed. Add hemorrhoid wipes or ointments if needed    Constipation  1. Constipation is normal after pregnancy and delivery, especially while taking prescription narcotic pain medication. 2. Over the counter remedies including ducosate (Colace), take 1-2 capsules 1-2 times daily for soft stool as needed. You may also add/ try milk of magnesia or rectal remedies such as Dulcolax or Fleets enema. Recovery: What to Expect at Home  1. Fatigue is expected. Try to rest when you can and don't worry about doing housework or other tasks which can wait. 2. The soreness along your bottom will improve significantly over the first 2 weeks, but it may take 6 weeks before you are completely recovered. 3. Back pain or general body aches or muscle soreness are expected and should improve with acetominophen or ibuprofen. 4. Leg swelling due to pregnancy and/or IV fluids given in the hospital will take about two weeks to resolve. 5. Most women experience some form of the \"Baby Blues\" after having a baby. Feeling emotional, tearful, frustrated, anxious, sad, and irritable some of the time is normal and go away after about 2 weeks. Adequate rest and help from your family will help. Take breaks from caring for the baby. Call your doctor if your symptoms seem severe, last more than 2 weeks, or seem to be getting worse instead of better. Get help immediately if you have thoughts of wanting to hurt yourself or others! Call your doctor or seek immediate medical care if you have:  Heavy vaginal bleeding, soaking through one or more pads an hour for several hours. Foul-smelling discharge from your vagina or incision.   Consistent nausea and vomiting and cannot keep fluids down. Consistent pain that does not get better after you take pain medicine.   Sudden chest pain and shortness of breath  Signs of a blood clot: pain/ swelling/ increasing redness in your lower extremeties  Signs of infection: increased pain in your abdomen or vaginal area; red streaks, warmth, or tenderness of your breasts; fever of 100.5 F or greater

## 2018-08-31 NOTE — LACTATION NOTE
This note was copied from a baby's chart. Mother eating breakfast.  Mother has been formula feeding baby since birth however stated yesterday that she would like pump and feed EBM. Mother pumped yesterday but stopped because \"I didn't see any milk\". Explained lactogenesis to mother, emphasized that she may not see any \"milk\" for a few days however if she continues to pump she will start getting milk. Lots of breastfeeding info shared, lots of printed info given. Mothers questions answered. Mother does not have a pump at home. Explained how to obtain a breast pump through insurance. Showed mother how to convert breast pump supplies in room into a manual pump. Pt will successfully establish breastfeeding by feeding in response to early feeding cues  
or wake every 3h, will obtain deep latch, and will keep log of feedings/output. Taught to BF at hunger cues and or q 2-3 hrs and to offer 10-20 drops of hand expressed colostrum at any non-feeds. Breast Assessment Left Breast: Extra large Right Breast: Extra large Breast- Feeding Assessment Attends Breast-Feeding Classes: No 
Breast-Feeding Experience: Yes (Blend feeds, more formula than breast per pt) Breast Trauma/Surgery: No 
Type/Quality: Poor Lactation Consultant Visits Breast-Feedings: Not breast-feeding (mother formula feeding, may pump at home) LATCH Documentation Latch:  (LC did not observe BF session due to formula feedings)

## 2019-10-10 ENCOUNTER — IP HISTORICAL/CONVERTED ENCOUNTER (OUTPATIENT)
Dept: OTHER | Age: 34
End: 2019-10-10

## 2020-05-04 ENCOUNTER — ED HISTORICAL/CONVERTED ENCOUNTER (OUTPATIENT)
Dept: OTHER | Age: 35
End: 2020-05-04

## 2020-12-11 ENCOUNTER — INITIAL PRENATAL (OUTPATIENT)
Dept: OBGYN CLINIC | Age: 35
End: 2020-12-11
Payer: MEDICAID

## 2020-12-11 VITALS
HEIGHT: 65 IN | BODY MASS INDEX: 47 KG/M2 | WEIGHT: 282.13 LBS | SYSTOLIC BLOOD PRESSURE: 140 MMHG | DIASTOLIC BLOOD PRESSURE: 84 MMHG

## 2020-12-11 DIAGNOSIS — Z34.82 PRENATAL CARE, SUBSEQUENT PREGNANCY, SECOND TRIMESTER: Primary | ICD-10-CM

## 2020-12-11 PROCEDURE — 0500F INITIAL PRENATAL CARE VISIT: CPT | Performed by: OBSTETRICS & GYNECOLOGY

## 2020-12-11 NOTE — PROGRESS NOTES
HISTORY OF PRESENT ILLNESS  Angie Andrews is a 30 y.o. female who presents today for the following:  Chief Complaint   Patient presents with    Initial Prenatal Visit   Patient is a 28-year-old [address] [facility],  female who presents today as a new OB patient. She reports her last menstrual period was in August but she is unsure of dates. She reports she is feeling fetal movement      No Known Allergies    Current Outpatient Medications   Medication Sig    oxyCODONE-acetaminophen (PERCOCET) 5-325 mg per tablet Take 1 Tab by mouth every four (4) hours as needed. Max Daily Amount: 6 Tabs. No current facility-administered medications for this visit. Past Medical History:   Diagnosis Date    Diabetes ([facility] [address]     Syphilis     treated in May    Trichimoniasis 2018    treated in this pregnancy       Past Surgical History:   Procedure Laterality Date    HX  SECTION         No family history on file.     Social History     Socioeconomic History    Marital status: SINGLE     Spouse name: Not on file    Number of children: Not on file    Years of education: Not on file    Highest education level: Not on file   Occupational History    Not on file   Social Needs    Financial resource strain: Not on file    Food insecurity     Worry: Not on file     Inability: Not on file    Transportation needs     Medical: Not on file     Non-medical: Not on file   Tobacco Use    Smoking status: Former Smoker     Packs/day: 0.25     Years: 2.00     Pack years: 0.50    Smokeless tobacco: Never Used   Substance and Sexual Activity    Alcohol use: No    Drug use: No    Sexual activity: Yes     Partners: Male   Lifestyle    Physical activity     Days per week: Not on file     Minutes per session: Not on file    Stress: Not on file   Relationships    Social connections     Talks on phone: Not on file     Gets together: Not on file     Attends [employer] service: Not on file     Active member of club or organization: Not on file     Attends meetings of clubs or organizations: Not on file     Relationship status: Not on file    Intimate partner violence     Fear of current or ex partner: Not on file     Emotionally abused: Not on file     Physically abused: Not on file     Forced sexual activity: Not on file   Other Topics Concern    Not on file   Social History Narrative    Not on file           REVIEW OF SYSTEMS     Constitutional: Negative for chills, fever and malaise/fatigue. HENT: Negative for congestion, hearing loss and sore throat. Respiratory: Negative for cough, sputum production, shortness of breath and wheezing. Cardiovascular: Negative for chest pain. Gastrointestinal: Negative for abdominal pain, constipation, diarrhea, nausea and vomiting. Genitourinary: Negative for dysuria, flank pain, hematuria and urgency. Neurological: Negative for dizziness, loss of consciousness, weakness and headaches. Psychiatric/Behavioral: Negative for depression.      PHYSICAL EXAM  BP (!) 140/84   Ht 5' 5\" (1.651 m)   Wt 282 lb 2 oz (128 kg)   BMI 46.95 kg/m²      Patient is a well-developed well-nourished female no apparent distress  She is alert and oriented x3  Head is normocephalic atraumatic pupils equal round react light accommodation  Neck is supple without adenopathy or thyromegaly  Heart is with regular rate and rhythm without murmurs rubs or gallops  Lungs are clear to auscultation and percussion bilaterally  Breasts are without masses bilaterally  Abdomen is soft nontender nondistended bowel sounds are present and active  Extremities are without clubbing cyanosis or edema  Pulses are full and symmetric bilaterally  Pelvic  External genitalia within normal limits  Urethra is midline there are no apparent urethral lesions the bladder is within normal limits  Vagina is with normal rugae there is minimal discharge present in the vaginal vault  Cervix is parous, there are no apparent cervical lesions, there is no cervical motion tenderness  Uterus is gravid, fundus is difficult to palpate secondary to maternal habitus  Adnexa are without masses    ASSESSMENT and PLAN  Normal new OB exam with uncertain dating  Plan: Pap performed, ultrasound ordered, follow-up in 4 weeks  No results found for this visit on 12/11/20. Orders Placed This Encounter    PRENATAL PROFILE I    CYSTIC FIBROSIS MUTATION 80    HEMOGLOBIN FRACTIONATION    HEPATITIS C AB, RFLX TO QT BY PCR    HIV 1/2 AG/AB, 4TH GENERATION,W RFLX CONFIRM    PAP LB, CT-NG TV RFX HPV RNK([id number], [id number])     Order Specific Question:   Pap Source? Answer:   Cervical     Order Specific Question:   Total Hysterectomy? Answer:   No     Order Specific Question:   Supracervical Hysterectomy? Answer:   No     Order Specific Question:   Post Menopausal?     Answer:   No     Order Specific Question:   Hormone Therapy? Answer:   No     Order Specific Question:   IUD? Answer:   No     Order Specific Question:   Abnormal Bleeding? Answer:   No     Order Specific Question:   Pregnant     Answer:   Yes     Order Specific Question:   Post Partum? Answer:    No

## 2020-12-11 NOTE — PROGRESS NOTES
Ketones:NEG // SG:NEG //  Jolaine Patch //  Ph:NEG //  Nit:NEG // Leuk: NEG   Pt presents for NOB appt, no prior prenatal care, with no complaints//OB PAP, OB PANEL TODAY//Tonya

## 2020-12-15 LAB
C TRACH RRNA CVX QL NAA+PROBE: NEGATIVE
CYTOLOGIST CVX/VAG CYTO: NORMAL
CYTOLOGY CVX/VAG DOC CYTO: NORMAL
DX ICD CODE: NORMAL
LABCORP, 190119: NORMAL
Lab: NORMAL
N GONORRHOEA RRNA CVX QL NAA+PROBE: NEGATIVE
OTHER STN SPEC: NORMAL
STAT OF ADQ CVX/VAG CYTO-IMP: NORMAL
T VAGINALIS RRNA SPEC QL NAA+PROBE: NEGATIVE

## 2021-01-26 ENCOUNTER — HOSPITAL ENCOUNTER (OUTPATIENT)
Dept: MAMMOGRAPHY | Age: 36
Discharge: HOME | End: 2021-01-26
Attending: OBSTETRICS & GYNECOLOGY
Payer: MEDICAID

## 2021-01-26 DIAGNOSIS — Z34.82 PRENATAL CARE, SUBSEQUENT PREGNANCY, SECOND TRIMESTER: ICD-10-CM

## 2021-01-26 PROCEDURE — 76805 OB US >/= 14 WKS SNGL FETUS: CPT

## 2021-03-16 ENCOUNTER — ROUTINE PRENATAL (OUTPATIENT)
Dept: OBGYN CLINIC | Age: 36
End: 2021-03-16
Payer: MEDICAID

## 2021-03-16 ENCOUNTER — HOSPITAL ENCOUNTER (INPATIENT)
Age: 36
LOS: 3 days | Discharge: HOME | DRG: 560 | End: 2021-03-19
Attending: OBSTETRICS & GYNECOLOGY | Admitting: OBSTETRICS & GYNECOLOGY
Payer: MEDICAID

## 2021-03-16 VITALS
BODY MASS INDEX: 48.62 KG/M2 | WEIGHT: 291.8 LBS | SYSTOLIC BLOOD PRESSURE: 196 MMHG | DIASTOLIC BLOOD PRESSURE: 108 MMHG | OXYGEN SATURATION: 99 % | HEIGHT: 65 IN

## 2021-03-16 DIAGNOSIS — Z34.83 PRENATAL CARE, SUBSEQUENT PREGNANCY, THIRD TRIMESTER: Primary | ICD-10-CM

## 2021-03-16 PROBLEM — Z3A.37 37 WEEKS GESTATION OF PREGNANCY: Status: ACTIVE | Noted: 2021-03-16

## 2021-03-16 PROBLEM — O10.919 HTN IN PREGNANCY, CHRONIC: Status: ACTIVE | Noted: 2021-03-16

## 2021-03-16 PROBLEM — O34.219 HISTORY OF SUCCESSFUL VAGINAL BIRTH AFTER CESAREAN, CURRENTLY PREGNANT: Status: ACTIVE | Noted: 2021-03-16

## 2021-03-16 PROBLEM — O09.33 PRENATAL CARE INSUFFICIENT, THIRD TRIMESTER: Status: ACTIVE | Noted: 2021-03-16

## 2021-03-16 PROBLEM — O99.213 OBESITY AFFECTING PREGNANCY IN THIRD TRIMESTER: Status: ACTIVE | Noted: 2021-03-16

## 2021-03-16 PROBLEM — Z98.891 HISTORY OF CESAREAN SECTION: Status: ACTIVE | Noted: 2021-03-16

## 2021-03-16 PROBLEM — Z64.1 MULTIPAROUS: Status: ACTIVE | Noted: 2021-03-16

## 2021-03-16 PROBLEM — Z86.19 HISTORY OF GROUP B STREPTOCOCCUS (GBS) INFECTION: Status: ACTIVE | Noted: 2021-03-16

## 2021-03-16 LAB
ABO + RH BLD: NORMAL
ALBUMIN SERPL-MCNC: 2.6 G/DL (ref 3.5–5)
ALBUMIN/GLOB SERPL: 0.6 {RATIO} (ref 1.1–2.2)
ALP SERPL-CCNC: 150 U/L (ref 45–117)
ALT SERPL-CCNC: 12 U/L (ref 12–78)
AMPHET UR QL SCN: NEGATIVE
ANION GAP SERPL CALC-SCNC: 9 MMOL/L (ref 5–15)
APPEARANCE UR: CLEAR
AST SERPL W P-5'-P-CCNC: 11 U/L (ref 15–37)
BACTERIA URNS QL MICRO: NEGATIVE /HPF
BARBITURATES UR QL SCN: NEGATIVE
BASOPHILS # BLD: 0 K/UL (ref 0–0.1)
BASOPHILS NFR BLD: 0 % (ref 0–1)
BENZODIAZ UR QL: NEGATIVE
BILIRUB SERPL-MCNC: 0.2 MG/DL (ref 0.2–1)
BILIRUB UR QL: NEGATIVE
BLOOD BANK CMNT PATIENT-IMP: NORMAL
BLOOD GROUP ANTIBODIES SERPL: NEGATIVE
BUN SERPL-MCNC: 6 MG/DL (ref 6–20)
BUN/CREAT SERPL: 9 (ref 12–20)
CA-I BLD-MCNC: 8.8 MG/DL (ref 8.5–10.1)
CANNABINOIDS UR QL SCN: POSITIVE
CHLORIDE SERPL-SCNC: 108 MMOL/L (ref 97–108)
CO2 SERPL-SCNC: 22 MMOL/L (ref 21–32)
COCAINE UR QL SCN: NEGATIVE
COLOR UR: NORMAL
COVID-19 RAPID TEST, COVR: NOT DETECTED
CREAT SERPL-MCNC: 0.7 MG/DL (ref 0.55–1.02)
CREAT UR-MCNC: 52 MG/DL
DIFFERENTIAL METHOD BLD: ABNORMAL
DRUG SCRN COMMENT,DRGCM: ABNORMAL
EOSINOPHIL # BLD: 0.3 K/UL (ref 0–0.4)
EOSINOPHIL NFR BLD: 2 % (ref 0–7)
ERYTHROCYTE [DISTWIDTH] IN BLOOD BY AUTOMATED COUNT: 14.2 % (ref 11.5–14.5)
GLOBULIN SER CALC-MCNC: 4.4 G/DL (ref 2–4)
GLUCOSE SERPL-MCNC: 78 MG/DL (ref 65–100)
GLUCOSE UR STRIP.AUTO-MCNC: NEGATIVE MG/DL
HCT VFR BLD AUTO: 32.5 % (ref 35–47)
HGB BLD-MCNC: 10.7 G/DL (ref 11.5–16)
HGB UR QL STRIP: NEGATIVE
IMM GRANULOCYTES # BLD AUTO: 0.1 K/UL (ref 0–0.04)
IMM GRANULOCYTES NFR BLD AUTO: 0 % (ref 0–0.5)
KETONES UR QL STRIP.AUTO: NEGATIVE MG/DL
LEUKOCYTE ESTERASE UR QL STRIP.AUTO: NEGATIVE
LYMPHOCYTES # BLD: 2.8 K/UL (ref 0.8–3.5)
LYMPHOCYTES NFR BLD: 23 % (ref 12–49)
MCH RBC QN AUTO: 28.4 PG (ref 26–34)
MCHC RBC AUTO-ENTMCNC: 32.9 G/DL (ref 30–36.5)
MCV RBC AUTO: 86.2 FL (ref 80–99)
METHADONE UR QL: NEGATIVE
MONOCYTES # BLD: 1 K/UL (ref 0–1)
MONOCYTES NFR BLD: 8 % (ref 5–13)
MUCOUS THREADS URNS QL MICRO: NORMAL /LPF
NEUTS SEG # BLD: 8 K/UL (ref 1.8–8)
NEUTS SEG NFR BLD: 67 % (ref 32–75)
NITRITE UR QL STRIP.AUTO: NEGATIVE
NRBC # BLD: 0 K/UL (ref 0–0.01)
NRBC BLD-RTO: 0 PER 100 WBC
OPIATES UR QL: NEGATIVE
PCP UR QL: NEGATIVE
PH UR STRIP: 7 [PH] (ref 5–8)
PLATELET # BLD AUTO: 228 K/UL (ref 150–400)
PMV BLD AUTO: 13.6 FL (ref 8.9–12.9)
POTASSIUM SERPL-SCNC: 3.6 MMOL/L (ref 3.5–5.1)
PROT SERPL-MCNC: 7 G/DL (ref 6.4–8.2)
PROT UR STRIP-MCNC: NEGATIVE MG/DL
PROT UR-MCNC: 21 MG/DL (ref 0–11.9)
PROT/CREAT UR-RTO: 0.4
RBC # BLD AUTO: 3.77 M/UL (ref 3.8–5.2)
RBC #/AREA URNS HPF: NORMAL /HPF (ref 0–5)
SARS-COV-2, COV2: NORMAL
SODIUM SERPL-SCNC: 139 MMOL/L (ref 136–145)
SP GR UR REFRACTOMETRY: 1.01 (ref 1–1.03)
SPECIMEN EXP DATE BLD: NORMAL
SPECIMEN SOURCE: NORMAL
UROBILINOGEN UR QL STRIP.AUTO: 0.1 EU/DL (ref 0.1–1)
WBC # BLD AUTO: 12.1 K/UL (ref 3.6–11)
WBC URNS QL MICRO: NORMAL /HPF (ref 0–4)

## 2021-03-16 PROCEDURE — 74011250636 HC RX REV CODE- 250/636

## 2021-03-16 PROCEDURE — 80053 COMPREHEN METABOLIC PANEL: CPT

## 2021-03-16 PROCEDURE — 86901 BLOOD TYPING SEROLOGIC RH(D): CPT

## 2021-03-16 PROCEDURE — 85025 COMPLETE CBC W/AUTO DIFF WBC: CPT

## 2021-03-16 PROCEDURE — 80307 DRUG TEST PRSMV CHEM ANLYZR: CPT

## 2021-03-16 PROCEDURE — 0502F SUBSEQUENT PRENATAL CARE: CPT | Performed by: OBSTETRICS & GYNECOLOGY

## 2021-03-16 PROCEDURE — 87070 CULTURE OTHR SPECIMN AEROBIC: CPT

## 2021-03-16 PROCEDURE — 81001 URINALYSIS AUTO W/SCOPE: CPT

## 2021-03-16 PROCEDURE — 36415 COLL VENOUS BLD VENIPUNCTURE: CPT

## 2021-03-16 PROCEDURE — 74011250637 HC RX REV CODE- 250/637: Performed by: OBSTETRICS & GYNECOLOGY

## 2021-03-16 PROCEDURE — 74011250636 HC RX REV CODE- 250/636: Performed by: OBSTETRICS & GYNECOLOGY

## 2021-03-16 PROCEDURE — 99285 EMERGENCY DEPT VISIT HI MDM: CPT

## 2021-03-16 PROCEDURE — 65410000002 HC RM PRIVATE OB

## 2021-03-16 PROCEDURE — 75810000275 HC EMERGENCY DEPT VISIT NO LEVEL OF CARE

## 2021-03-16 PROCEDURE — 59200 INSERT CERVICAL DILATOR: CPT

## 2021-03-16 PROCEDURE — 87635 SARS-COV-2 COVID-19 AMP PRB: CPT

## 2021-03-16 PROCEDURE — 10907ZC DRAINAGE OF AMNIOTIC FLUID, THERAPEUTIC FROM PRODUCTS OF CONCEPTION, VIA NATURAL OR ARTIFICIAL OPENING: ICD-10-PCS | Performed by: OBSTETRICS & GYNECOLOGY

## 2021-03-16 PROCEDURE — 84156 ASSAY OF PROTEIN URINE: CPT

## 2021-03-16 PROCEDURE — 74011000258 HC RX REV CODE- 258: Performed by: OBSTETRICS & GYNECOLOGY

## 2021-03-16 RX ORDER — LABETALOL 200 MG/1
400 TABLET, FILM COATED ORAL EVERY 12 HOURS
Status: DISCONTINUED | OUTPATIENT
Start: 2021-03-16 | End: 2021-03-19 | Stop reason: HOSPADM

## 2021-03-16 RX ORDER — BUTORPHANOL TARTRATE 1 MG/ML
INJECTION INTRAMUSCULAR; INTRAVENOUS
Status: DISPENSED
Start: 2021-03-16 | End: 2021-03-17

## 2021-03-16 RX ORDER — ACETAMINOPHEN 325 MG/1
650 TABLET ORAL
Status: DISCONTINUED | OUTPATIENT
Start: 2021-03-16 | End: 2021-03-18

## 2021-03-16 RX ORDER — HYDRALAZINE HYDROCHLORIDE 20 MG/ML
10 INJECTION INTRAMUSCULAR; INTRAVENOUS ONCE
Status: COMPLETED | OUTPATIENT
Start: 2021-03-16 | End: 2021-03-16

## 2021-03-16 RX ORDER — BUTORPHANOL TARTRATE 1 MG/ML
1 INJECTION INTRAMUSCULAR; INTRAVENOUS
Status: COMPLETED | OUTPATIENT
Start: 2021-03-16 | End: 2021-03-17

## 2021-03-16 RX ORDER — PENICILLIN G 3000000 [IU]/50ML
3 INJECTION, SOLUTION INTRAVENOUS EVERY 4 HOURS
Status: DISCONTINUED | OUTPATIENT
Start: 2021-03-17 | End: 2021-03-18

## 2021-03-16 RX ORDER — SODIUM CHLORIDE, SODIUM LACTATE, POTASSIUM CHLORIDE, CALCIUM CHLORIDE 600; 310; 30; 20 MG/100ML; MG/100ML; MG/100ML; MG/100ML
999 INJECTION, SOLUTION INTRAVENOUS AS NEEDED
Status: DISCONTINUED | OUTPATIENT
Start: 2021-03-16 | End: 2021-03-18

## 2021-03-16 RX ORDER — OXYTOCIN/RINGER'S LACTATE 30/500 ML
0-20 PLASTIC BAG, INJECTION (ML) INTRAVENOUS
Status: CANCELLED | OUTPATIENT
Start: 2021-03-16

## 2021-03-16 RX ORDER — SODIUM CHLORIDE, SODIUM LACTATE, POTASSIUM CHLORIDE, CALCIUM CHLORIDE 600; 310; 30; 20 MG/100ML; MG/100ML; MG/100ML; MG/100ML
125 INJECTION, SOLUTION INTRAVENOUS CONTINUOUS
Status: DISCONTINUED | OUTPATIENT
Start: 2021-03-16 | End: 2021-03-18

## 2021-03-16 RX ADMIN — HYDRALAZINE HYDROCHLORIDE 10 MG: 20 INJECTION INTRAMUSCULAR; INTRAVENOUS at 19:18

## 2021-03-16 RX ADMIN — BUTORPHANOL TARTRATE 1 MG: 1 INJECTION, SOLUTION INTRAMUSCULAR; INTRAVENOUS at 20:50

## 2021-03-16 RX ADMIN — SODIUM CHLORIDE 5 MILLION UNITS: 900 INJECTION INTRAVENOUS at 23:05

## 2021-03-16 RX ADMIN — HYDRALAZINE HYDROCHLORIDE 10 MG: 20 INJECTION INTRAMUSCULAR; INTRAVENOUS at 18:12

## 2021-03-16 RX ADMIN — LABETALOL HYDROCHLORIDE 400 MG: 200 TABLET, FILM COATED ORAL at 19:57

## 2021-03-16 RX ADMIN — SODIUM CHLORIDE, POTASSIUM CHLORIDE, SODIUM LACTATE AND CALCIUM CHLORIDE 999 ML/HR: 600; 310; 30; 20 INJECTION, SOLUTION INTRAVENOUS at 22:20

## 2021-03-16 NOTE — PROGRESS NOTES
1900 Report received from GUERA Drake, KEZIA to assume care of patient at this time.    1908 EFM removed for patient to ambulate to restroom.    1920 Dr. Reeves at bedside for cook balloon placement.     1935 Balloon placed with 50ml inside(red)/40ml outside(green); patient tolerated procedure well. Rating her pain as a 2/10.    1940 Orders received for Labetalol 400mg BID    1959 Patient sitting up to take PO Labetalol.    2042 Patient states her contractions are about every 5 minutes; not tracing consistently on the monitor; patient rates her contractions a 5/10, requests IV pain medication. States that she had a severe headache (8/10) earlier today and was seeing little white flecks in her vision. Patient states her headache is now about a 3-4/10, and she is no longer having any visual disturbances. Patient instructed to update us if her headache worsens or if she starts having visual changes again. Patient instructed to let us know if she believes she may be starting to labor, if her water breaks, and if she feels like the balloon is coming out.     2043 EFM removed for patient to ambulate to restroom    *Discussing history with patient; states that all of her babies have been less than 8lbs; denies any trouble with babies getting stuck; denies any history of PPH; states that her C/S was due to severe preeclampsia, and that she has had 2 vaginal deliveries since then. OB history updated.     2106 Patient sitting up to eat; TOCO not tracing accurately. Patient states contractions are about every 5 minutes.    2122 MD updated on patient status; GBS swab orders; PCN 5 mil, 3 mil    2220 Patient turned from Right lateral to Left lateral    2320 Patient turned from Left lateral to Right lateral    0016 Called and requested that pharmacy verify tylenol    0050 Patient given 650mg Tylenol for headache rated 5/10.    0100 Difficulty in tracing contractions. Patient states contractions are mild, but she feels them about  every 10 minutes. Patient instructed to inform nursing staff if contractions become more frequent or more painful.     0151 Tums for heartburn; patient states her contractions are still about every 10 minutes; rating them 5/10; requesting more stadol. 0157 EFM removed for patient to ambulate to restroom. 0209 Patient given Stadol. 6400 Patient resting on left side with wedge behind her back. Sleeping through contractions. 0704 Patient c/o pain with contractions; rating 6-7/10.     0450 MD updated on patient condition and length of contractions; orders received to remove balloon and call back if not improved. 0457 Cervical ripening balloon removed; 50ml from internal; 40ml from external; patient tolerated well.     0505 EFM removed for patient to ambulate to restroom. 0517 SVE by DK Cintron RN; 2-3/soft/posterior/-3.     0700 Report given to Efrain Mcneil RN to assume care of patient at this time.

## 2021-03-16 NOTE — PROGRESS NOTES
Patient not seen since 12/11/2020 presents today with marked blood pressure elevation for her routine prenatal visit at 37 weeks and 3 days.   Patient sent to labor and delivery for evaluation

## 2021-03-16 NOTE — ROUTINE PROCESS
TRANSFER - OUT REPORT:    Verbal report given to Charge nurse(name) on Olesya Noe  being transferred to L&D(unit) for routine progression of care       Report consisted of patients Situation, Background, Assessment and   Recommendations(SBAR). Information from the following report(s) ED Summary was reviewed with the receiving nurse. Lines:       Opportunity for questions and clarification was provided.       Patient transported with:   Lagiar

## 2021-03-16 NOTE — PROGRESS NOTES
1620  Received from ED with elevated BP. Unable to void. 1635  To EFM with hob elevated 30 degrees. /95. Reflexes wnl, no clonus. Pt verbalizes taking BP medicine sometimes but not consistently. Does not know the name or amount. 1700  C/O headache and visual disturbances. IV lock placed with blood draw. 1710  COVID swab obtained. 1730  178/103  Patient gives history of 3 vaginal deliveries, 1 c/s for pre-eclampsia, and 2 vaginal deliveries since the c/s. States she has rapid labors with her last child being 3year old. 1745  178/141 Pt lying on cuff. Repositioned to right tilt. 1752  156/75  1800  152/80  Dr. Melissa Trammell on unit. Orders received. 1812  Hydralazine 10 mg IVP as ordered. 1815  163/90  1825  163/93  1830  159/86  Sleeping  1845   174/73  1900  Dr. Melissa Trammell at bedside assessing pt. (H&P),  Discussed plan of care (balloon, Labetalol & Hydrolazine)  Verbalizes understanding. Bedside report given to RUPAL Prabhakar

## 2021-03-17 ENCOUNTER — ANESTHESIA EVENT (OUTPATIENT)
Dept: LABOR AND DELIVERY | Age: 36
DRG: 560 | End: 2021-03-17
Payer: MEDICAID

## 2021-03-17 ENCOUNTER — ANESTHESIA (OUTPATIENT)
Dept: LABOR AND DELIVERY | Age: 36
DRG: 560 | End: 2021-03-17
Payer: MEDICAID

## 2021-03-17 VITALS — SYSTOLIC BLOOD PRESSURE: 157 MMHG | DIASTOLIC BLOOD PRESSURE: 108 MMHG | OXYGEN SATURATION: 100 % | HEART RATE: 93 BPM

## 2021-03-17 LAB
HBV SURFACE AG SER QL: <0.1 INDEX
HBV SURFACE AG SER QL: NEGATIVE
HIV 1+2 AB+HIV1 P24 AG SERPL QL IA: NONREACTIVE
HIV1 P24 AG SERPL QL IA: NONREACTIVE
HIV1+2 AB SERPL QL IA: NONREACTIVE
HIV12 RESULT COMMENT, HHIVC: NORMAL
RUBV IGG SER-IMP: REACTIVE
RUBV IGG SERPL IA-ACNC: 155.4 IU/ML

## 2021-03-17 PROCEDURE — 36415 COLL VENOUS BLD VENIPUNCTURE: CPT

## 2021-03-17 PROCEDURE — 74011250636 HC RX REV CODE- 250/636

## 2021-03-17 PROCEDURE — 87340 HEPATITIS B SURFACE AG IA: CPT

## 2021-03-17 PROCEDURE — 86592 SYPHILIS TEST NON-TREP QUAL: CPT

## 2021-03-17 PROCEDURE — 76060000078 HC EPIDURAL ANESTHESIA

## 2021-03-17 PROCEDURE — 86780 TREPONEMA PALLIDUM: CPT

## 2021-03-17 PROCEDURE — 74011250636 HC RX REV CODE- 250/636: Performed by: OBSTETRICS & GYNECOLOGY

## 2021-03-17 PROCEDURE — 65410000002 HC RM PRIVATE OB

## 2021-03-17 PROCEDURE — 00HU33Z INSERTION OF INFUSION DEVICE INTO SPINAL CANAL, PERCUTANEOUS APPROACH: ICD-10-PCS | Performed by: ANESTHESIOLOGY

## 2021-03-17 PROCEDURE — 87389 HIV-1 AG W/HIV-1&-2 AB AG IA: CPT

## 2021-03-17 PROCEDURE — 86762 RUBELLA ANTIBODY: CPT

## 2021-03-17 PROCEDURE — 87086 URINE CULTURE/COLONY COUNT: CPT

## 2021-03-17 PROCEDURE — 74011250637 HC RX REV CODE- 250/637: Performed by: OBSTETRICS & GYNECOLOGY

## 2021-03-17 PROCEDURE — 74011000250 HC RX REV CODE- 250: Performed by: NURSE ANESTHETIST, CERTIFIED REGISTERED

## 2021-03-17 RX ORDER — LIDOCAINE HYDROCHLORIDE AND EPINEPHRINE 15; 5 MG/ML; UG/ML
INJECTION, SOLUTION EPIDURAL
Status: SHIPPED | OUTPATIENT
Start: 2021-03-17 | End: 2021-03-17

## 2021-03-17 RX ORDER — BUPIVACAINE HYDROCHLORIDE 2.5 MG/ML
INJECTION, SOLUTION EPIDURAL; INFILTRATION; INTRACAUDAL
Status: COMPLETED
Start: 2021-03-17 | End: 2021-03-17

## 2021-03-17 RX ORDER — LIDOCAINE HYDROCHLORIDE 10 MG/ML
INJECTION INFILTRATION; PERINEURAL
Status: DISCONTINUED
Start: 2021-03-17 | End: 2021-03-18

## 2021-03-17 RX ORDER — NORETHINDRONE AND ETHINYL ESTRADIOL 0.5-0.035
12.5 KIT ORAL ONCE
Status: ACTIVE | OUTPATIENT
Start: 2021-03-17 | End: 2021-03-17

## 2021-03-17 RX ORDER — LIDOCAINE HYDROCHLORIDE 10 MG/ML
500 INJECTION, SOLUTION EPIDURAL; INFILTRATION; INTRACAUDAL; PERINEURAL ONCE
Status: DISCONTINUED | OUTPATIENT
Start: 2021-03-17 | End: 2021-03-18

## 2021-03-17 RX ORDER — OXYTOCIN/RINGER'S LACTATE 30/500 ML
PLASTIC BAG, INJECTION (ML) INTRAVENOUS
Status: COMPLETED
Start: 2021-03-17 | End: 2021-03-17

## 2021-03-17 RX ORDER — BUPIVACAINE HYDROCHLORIDE 2.5 MG/ML
INJECTION, SOLUTION EPIDURAL; INFILTRATION; INTRACAUDAL AS NEEDED
Status: DISCONTINUED | OUTPATIENT
Start: 2021-03-17 | End: 2021-03-18 | Stop reason: HOSPADM

## 2021-03-17 RX ORDER — CALCIUM CARBONATE 200(500)MG
200 TABLET,CHEWABLE ORAL
Status: DISCONTINUED | OUTPATIENT
Start: 2021-03-17 | End: 2021-03-17

## 2021-03-17 RX ORDER — CALCIUM CARBONATE 200(500)MG
400 TABLET,CHEWABLE ORAL
Status: DISCONTINUED | OUTPATIENT
Start: 2021-03-17 | End: 2021-03-19 | Stop reason: HOSPADM

## 2021-03-17 RX ORDER — OXYTOCIN/RINGER'S LACTATE 30/500 ML
2 PLASTIC BAG, INJECTION (ML) INTRAVENOUS
Status: DISCONTINUED | OUTPATIENT
Start: 2021-03-17 | End: 2021-03-18

## 2021-03-17 RX ORDER — FENTANYL 0.2 MG/100ML-BUPIV 0.125%-NACL 0.9% EPIDURAL INJ 2/0.125 MCG/ML-%
SOLUTION INJECTION CONTINUOUS
Status: DISCONTINUED | OUTPATIENT
Start: 2021-03-17 | End: 2021-03-18

## 2021-03-17 RX ADMIN — Medication 2 MILLI-UNITS: at 11:35

## 2021-03-17 RX ADMIN — LIDOCAINE HYDROCHLORIDE AND EPINEPHRINE 5 ML: 15; 5 INJECTION, SOLUTION EPIDURAL at 08:28

## 2021-03-17 RX ADMIN — SODIUM CHLORIDE, POTASSIUM CHLORIDE, SODIUM LACTATE AND CALCIUM CHLORIDE 125 ML/HR: 600; 310; 30; 20 INJECTION, SOLUTION INTRAVENOUS at 08:33

## 2021-03-17 RX ADMIN — PENICILLIN G 3 MILLION UNITS: 3000000 INJECTION, SOLUTION INTRAVENOUS at 21:02

## 2021-03-17 RX ADMIN — BUTORPHANOL TARTRATE 1 MG: 1 INJECTION, SOLUTION INTRAMUSCULAR; INTRAVENOUS at 02:09

## 2021-03-17 RX ADMIN — SODIUM CHLORIDE, POTASSIUM CHLORIDE, SODIUM LACTATE AND CALCIUM CHLORIDE 125 ML/HR: 600; 310; 30; 20 INJECTION, SOLUTION INTRAVENOUS at 05:14

## 2021-03-17 RX ADMIN — CALCIUM CARBONATE (ANTACID) CHEW TAB 500 MG 400 MG: 500 CHEW TAB at 01:51

## 2021-03-17 RX ADMIN — LABETALOL HYDROCHLORIDE 400 MG: 200 TABLET, FILM COATED ORAL at 21:08

## 2021-03-17 RX ADMIN — ACETAMINOPHEN 650 MG: 325 TABLET ORAL at 00:50

## 2021-03-17 RX ADMIN — PENICILLIN G 3 MILLION UNITS: 3000000 INJECTION, SOLUTION INTRAVENOUS at 07:30

## 2021-03-17 RX ADMIN — SODIUM CHLORIDE, POTASSIUM CHLORIDE, SODIUM LACTATE AND CALCIUM CHLORIDE 125 ML/HR: 600; 310; 30; 20 INJECTION, SOLUTION INTRAVENOUS at 17:00

## 2021-03-17 RX ADMIN — PENICILLIN G 3 MILLION UNITS: 3000000 INJECTION, SOLUTION INTRAVENOUS at 15:43

## 2021-03-17 RX ADMIN — Medication 10 ML/HR: at 08:29

## 2021-03-17 RX ADMIN — PENICILLIN G 3 MILLION UNITS: 3000000 INJECTION, SOLUTION INTRAVENOUS at 11:38

## 2021-03-17 RX ADMIN — BUPIVACAINE HYDROCHLORIDE 10 ML: 2.5 INJECTION, SOLUTION EPIDURAL; INFILTRATION; INTRACAUDAL at 08:35

## 2021-03-17 RX ADMIN — PENICILLIN G 3 MILLION UNITS: 3000000 INJECTION, SOLUTION INTRAVENOUS at 03:15

## 2021-03-17 RX ADMIN — LABETALOL HYDROCHLORIDE 400 MG: 200 TABLET, FILM COATED ORAL at 12:18

## 2021-03-17 RX ADMIN — BUTORPHANOL TARTRATE 1 MG: 1 INJECTION, SOLUTION INTRAMUSCULAR; INTRAVENOUS at 04:11

## 2021-03-17 NOTE — PROGRESS NOTES
Spiritual Care Assessment/Progress Note  [facility]      NAME: Shawn Eason      MRN: [id number]  AGE: 30 y.o.  SEX: female  Hindu Affiliation: Sd   Language: English     3/17/2021     Total Time (in minutes): 10     Spiritual Assessment begun in [facility] LABOR & DELIVERY through conversation with:         [x]Patient        [] Family    [] Friend(s)        Reason for Consult: Request by staff     Spiritual beliefs: (Please include comment if needed)     [x] Identifies with a felisa tradition:   Nelii     [] Supported by a felisa community:            [] Claims no spiritual orientation:           [] Seeking spiritual identity:                [] Adheres to an individual form of spirituality:           [] Not able to assess:                           Identified resources for coping:      [x] Prayer                               [] Music                  [] Guided Imagery     [] Family/friends                 [] Pet visits     [] Devotional reading                         [] Unknown     [] Other:                                               Interventions offered during this visit: (See comments for more details)    Patient Interventions: Prayer (assurance of), Hindu beliefs/image of God discussed, Initial/Spiritual assessment, patient floor, Affirmation of felisa, Iconic (affirming the presence of God/Higher Power)           Plan of Care:     [] Support spiritual and/or cultural needs    [] Support AMD and/or advance care planning process      [] Support grieving process   [] Coordinate Rites and/or Rituals    [] Coordination with community clergy   [] No spiritual needs identified at this time   [] Detailed Plan of Care below (See Comments)  [] Make referral to Music Therapy  [] Make referral to Pet Therapy     [] Make referral to Addiction services  [] Make referral to [facility]  [] Make referral to Spiritual Care Partner  [] No future visits requested        [x] Follow up upon further referrals     Comments:  responded to in basket request for spiritual care with Kimi Yoo on L&D unit. Consulted with nurse and visited pt in her room. She indicated she has no need for spiritual support at this time, but would appreciate a  coming to pray for her baby, when baby is born.  advised her to contact spiritual care when baby arrives. Pt thanked  for the visit. Visited by: Jerene Sacks.    can be reached by calling the  at [facility]  [phone]

## 2021-03-17 NOTE — PROGRESS NOTES
0700- Bedside shift change report received from Devan Morales RN. Pt participated in report. Pt ready for epidural, pain 5/10.     0715- Shift assessment performed and vital signs assessed. Pt sleeping. Baby bed checked for functioning and available equipment.    4511- CRNA notified pt is ready for epidural.    9105- PCN IV hung. Dr. Feliz Arrant to bedside to perform cervical exam, MD states pt's bladder is full and instructs pt to go void and he will come back to recheck. 5408- Pt up to bathroom and voided w/o difficulty. 4269- CRNA to bedside asking pt her past medical history. 0743-PCN paused for pre-epidural IVF bolus. 46- Dr. Trini Rai returned to bedside to perform cervical exam, states she is \"at least 2 cm. \" MD states \"after you get your epidural, we will put in a bigger balloon and get your cervix to 6 cm and start some pitocin. \"    0440- CRNA states he will \"return after she gets her IVF bolus so that her BP doesn't drop. \"    Javi Sayres returns to room and asks pt how she's doing. Informed CRNA that pt's SBP is now 170s. CRNA states, \"the IVF bolus is almost finished, so we'll go ahead and put the epidural in now. \"    9042- Epidural time out performed. 1568- Epidural catheter inserted by CRNA.    5464- Epidural bolus dose administered by CRNA and pt hooked back up to EFM. 5- Nursery in providing education and getting consents signed. 5623- Chung catheter inserted under sterile technique. Urine culture collected. 0919- Pt BP dropped to 95/55, pt states she is slightly dizzy. Pt repositioned to R side and sat up 30 degrees. EFM readjusted. Pt feeling better. Provided pt cup of ice. Pt sleeping. 0945- Pt SBP now 130s. ALEK/Orly Easton 1106 to unit states pt requested a visit. In to room to ask pt. Pt states \"I would like the maximus to come pray over the baby once the baby is born. \" Relayed information to maximus. Camila Mcintosh entered pt's room to talk to pt.    1055- Dr. Feliz Arrisabel at bedside.  Pt repositioned for insertion of cook balloon. 1100- Dr. Lashay Li states pt is 3 cm dilated. [facility] balloon placed successfully. No pain during procedure. Pt repositioned to L side. EFM repositioned. Pt comfortable and sleeping. 1135- Pitocin started and PCN IV hung. Temp WNL    1219- Pt's BP now at pt's baseline post epidural placement, morning PO labetalol administered w/ sip of water. 1354- Pt states the cook balloon feels uncomfortable. Pt repositioned to R side and sitting up at 45 degrees. Informed pt she could press her epidural button if she feels uncomfortable. 12- Dr. Lashay Li at bedside, cook balloon removed, 5 cm dilated and baby floating. Pt repositioned back on R side w/ pillow between legs. Pt feeling comfortable. Pain 0/10.    1530- Pt repositioned on L side with peanut ball between legs. IV PCN hung. Pt sleeping    1610- Chung urine output drained and recorded in flowsheet. Birthing table set up. 80- Dr. Lashay Li performed cervical exam, 6 cm dilated. Baby still just as high up. Pt repositioned back to L side w/ birthing ball between legs. 1702- Pt repositioned to R side w/ birthing ball. Pain 0/10.    1813- Pt repositioned to L side w/ birthing ball. Chung cath output emptied and charted. 1910- Bedside shift change report given to GUERA Alonso RN. Pt dozing off during report.

## 2021-03-17 NOTE — H&P
History and Physical    Patient: Kike Evans MRN: [id number]  SSN: [id number]    YOB: 1985  Age: 30 y.o. Sex: female      Subjective:      Kike Evans is a 30 y.o. female at 37.3 weeks sent from office for elevated BP's. Scant PNC- 2 visits the last in 2020. CHTN - who takes her meds sporadically. Denies any HA's or visual changes. 3 previous 's , followed by 1 LTCS due to uncontrollable BP's, followed by 2 successful 's. Hx of GDM in 2 pregnancies, all infants have been less than 8 lbs in weight. Had long DWP regarding delivery options- pt desires a . Risks benefits and alternatives DWP in detail, including possible harm to the infant and the pt. Questions addressed. Pt declines a RCS. DWP cannot use cervidil or Cytotec. DWP Cervical Balloon placement overnight and pitocin in AM    Past Medical History:   Diagnosis Date    Diabetes ([facility] [address]     HTN in pregnancy, chronic 3/16/2021    Syphilis     treated in May    Trichimoniasis 2018    treated in this pregnancy     Past Surgical History:   Procedure Laterality Date    HX  SECTION        No family history on file. Social History     Tobacco Use    Smoking status: Former Smoker     Packs/day: 0.25     Years: 2.00     Pack years: 0.50    Smokeless tobacco: Never Used   Substance Use Topics    Alcohol use: No      Prior to Admission medications    Medication Sig Start Date End Date Taking? Authorizing Provider   oxyCODONE-acetaminophen (PERCOCET) 5-325 mg per tablet Take 1 Tab by mouth every four (4) hours as needed. Max Daily Amount: 6 Tabs. 18   Anahi hCa MD        No Known Allergies    Review of Systems:  A comprehensive review of systems was negative except for that written in the History of Present Illness.     Objective:     Vitals:    21 2100 21   BP: (!) 143/69 (!) 141/74     Pulse: (!) 103 (!) 105     Resp:       Temp:       SpO2:   100% 100% Weight:       Height:            Physical Exam:  GENERAL: alert, cooperative, no distress, appears stated age  ABDOMEN: Gravid, NST reactive, occasional UC  Cervix: Viually closed- COOK cervical balloon placed without issues-50 cc inside and 40 cc in outside balloon( informed consent done prior to placement), pt tolerated placement with little discomfort    Assessment:     Hospital Problems  Date Reviewed: 3/16/2021          Codes Class Noted POA    37 weeks gestation of pregnancy ICD-10-CM: Z3A.37  ICD-9-CM: V22.2  3/16/2021 Unknown        Obesity affecting pregnancy in third trimester ICD-10-CM: O99.213  ICD-9-CM: 649.13  3/16/2021 Unknown        Prenatal care insufficient, third trimester ICD-10-CM: O09.33  ICD-9-CM: V23.7  3/16/2021 Unknown        Multiparous ICD-10-CM: Z64.1  ICD-9-CM: V61.5  3/16/2021 Unknown        HTN in pregnancy, chronic ICD-10-CM: O10.919  ICD-9-CM: 642.00  3/16/2021 Unknown        History of  section ICD-10-CM: Z98.891  ICD-9-CM: V45.89  3/16/2021 Unknown        History of successful vaginal birth after , currently pregnant ICD-10-CM: O34.219  ICD-9-CM: 654.20  3/16/2021 Unknown        History of group B Streptococcus (GBS) infection ICD-10-CM: Z86.19  ICD-9-CM: V12.09  3/16/2021 Unknown              Plan:     IV Hydralazine to control BP's, start PO labetalol 400 mg BID  GBS culture- start PCN( per pt GBS + in the past)  Cook balloon overnight    Signed By: Holger Yousif MD     2021

## 2021-03-17 NOTE — ANESTHESIA PROCEDURE NOTES
CSE Block    Start time: 3/17/2021 8:11 AM  End time: 3/17/2021 8:39 AM  Performed by: Rosina Horne CRNA  Authorized by:  Francesca Mcwilliams MD     Pre-Procedure  Indications: at surgeon's request and primary anesthetic    preanesthetic checklist: patient identified, risks and benefits discussed, anesthesia consent, patient being monitored and timeout performed    Timeout Time: 08:08        Procedure:   Patient Position:  Seated  Prep Region:  Lumbar  Prep: Betadine and chlorhexidine    Location:  L3-4    Epidural Needle:   Needle Type:  Tuohy  Needle Gauge:  17 G  Injection Technique:  Loss of resistance using saline  Attempts:  2    Spinal Needle:   Needle Type:  Tuohy    Catheter:   Catheter at Skin Depth (cm):  20  Depth in Epidural Space (cm):  11  Events: no blood with aspiration, no paresthesia and negative aspiration test    Test Dose:  Negative    Assessment:   Catheter Secured:  Tegaderm and tape  Insertion:  Uncomplicated  Patient tolerance:  Patient tolerated the procedure well with no immediate complications negative...

## 2021-03-17 NOTE — ANESTHESIA PREPROCEDURE EVALUATION
Relevant Problems   No relevant active problems       Anesthetic History   No history of anesthetic complications            Review of Systems / Medical History  Patient summary reviewed, nursing notes reviewed and pertinent labs reviewed    Pulmonary  Within defined limits                 Neuro/Psych   Within defined limits           Cardiovascular    Hypertension: well controlled              Exercise tolerance: >4 METS     GI/Hepatic/Renal     GERD: well controlled           Endo/Other        Morbid obesity     Other Findings              Physical Exam    Airway  Mallampati: III  TM Distance: 4 - 6 cm  Neck ROM: normal range of motion        Cardiovascular    Rhythm: regular  Rate: normal         Dental  No notable dental hx       Pulmonary  Breath sounds clear to auscultation               Abdominal  Abdominal exam normal       Other Findings            Anesthetic Plan    ASA: 3  Anesthesia type: epidural            Anesthetic plan and risks discussed with: Patient

## 2021-03-18 PROBLEM — Z34.90 PREGNANCY: Status: RESOLVED | Noted: 2018-08-25 | Resolved: 2021-03-18

## 2021-03-18 LAB
BACTERIA SPEC CULT: NORMAL
RPR SER QL: REACTIVE
RPR SER-TITR: REACTIVE {TITER}
SPECIAL REQUESTS,SREQ: NORMAL

## 2021-03-18 PROCEDURE — 65410000002 HC RM PRIVATE OB

## 2021-03-18 PROCEDURE — 74011250637 HC RX REV CODE- 250/637: Performed by: OBSTETRICS & GYNECOLOGY

## 2021-03-18 PROCEDURE — 76060000078 HC EPIDURAL ANESTHESIA

## 2021-03-18 PROCEDURE — 59200 INSERT CERVICAL DILATOR: CPT

## 2021-03-18 PROCEDURE — 74011250636 HC RX REV CODE- 250/636: Performed by: OBSTETRICS & GYNECOLOGY

## 2021-03-18 PROCEDURE — 75410000003 HC RECOV DEL/VAG/CSECN EA 0.5 HR

## 2021-03-18 PROCEDURE — 75410000000 HC DELIVERY VAGINAL/SINGLE

## 2021-03-18 PROCEDURE — 74011000250 HC RX REV CODE- 250: Performed by: NURSE ANESTHETIST, CERTIFIED REGISTERED

## 2021-03-18 PROCEDURE — 75410000002 HC LABOR FEE PER 1 HR

## 2021-03-18 RX ORDER — ONDANSETRON 4 MG/1
4 TABLET, ORALLY DISINTEGRATING ORAL
Status: ACTIVE | OUTPATIENT
Start: 2021-03-18 | End: 2021-03-19

## 2021-03-18 RX ORDER — HYDROCODONE BITARTRATE AND ACETAMINOPHEN 5; 325 MG/1; MG/1
1 TABLET ORAL
Status: DISCONTINUED | OUTPATIENT
Start: 2021-03-18 | End: 2021-03-19 | Stop reason: HOSPADM

## 2021-03-18 RX ORDER — DIPHENHYDRAMINE HCL 25 MG
25 CAPSULE ORAL
Status: DISCONTINUED | OUTPATIENT
Start: 2021-03-18 | End: 2021-03-19 | Stop reason: HOSPADM

## 2021-03-18 RX ORDER — OXYTOCIN/RINGER'S LACTATE 30/500 ML
87.3 PLASTIC BAG, INJECTION (ML) INTRAVENOUS AS NEEDED
Status: COMPLETED | OUTPATIENT
Start: 2021-03-18 | End: 2021-03-18

## 2021-03-18 RX ORDER — IBUPROFEN 800 MG/1
800 TABLET ORAL EVERY 8 HOURS
Status: DISCONTINUED | OUTPATIENT
Start: 2021-03-18 | End: 2021-03-19 | Stop reason: HOSPADM

## 2021-03-18 RX ORDER — BISACODYL 5 MG
5 TABLET, DELAYED RELEASE (ENTERIC COATED) ORAL DAILY PRN
Status: DISCONTINUED | OUTPATIENT
Start: 2021-03-18 | End: 2021-03-19 | Stop reason: HOSPADM

## 2021-03-18 RX ORDER — BUPIVACAINE HYDROCHLORIDE 2.5 MG/ML
INJECTION, SOLUTION EPIDURAL; INFILTRATION; INTRACAUDAL
Status: COMPLETED
Start: 2021-03-18 | End: 2021-03-18

## 2021-03-18 RX ORDER — NALOXONE HYDROCHLORIDE 0.4 MG/ML
0.4 INJECTION, SOLUTION INTRAMUSCULAR; INTRAVENOUS; SUBCUTANEOUS AS NEEDED
Status: DISCONTINUED | OUTPATIENT
Start: 2021-03-18 | End: 2021-03-19 | Stop reason: HOSPADM

## 2021-03-18 RX ORDER — ZOLPIDEM TARTRATE 5 MG/1
5 TABLET ORAL
Status: DISCONTINUED | OUTPATIENT
Start: 2021-03-18 | End: 2021-03-19 | Stop reason: HOSPADM

## 2021-03-18 RX ORDER — HYDROCORTISONE ACETATE PRAMOXINE HCL 1; 1 G/100G; G/100G
CREAM TOPICAL AS NEEDED
Status: DISCONTINUED | OUTPATIENT
Start: 2021-03-18 | End: 2021-03-19 | Stop reason: HOSPADM

## 2021-03-18 RX ORDER — OXYTOCIN/RINGER'S LACTATE 30/500 ML
10 PLASTIC BAG, INJECTION (ML) INTRAVENOUS AS NEEDED
Status: DISCONTINUED | OUTPATIENT
Start: 2021-03-18 | End: 2021-03-19 | Stop reason: HOSPADM

## 2021-03-18 RX ORDER — ACETAMINOPHEN 325 MG/1
650 TABLET ORAL
Status: DISCONTINUED | OUTPATIENT
Start: 2021-03-18 | End: 2021-03-19 | Stop reason: HOSPADM

## 2021-03-18 RX ADMIN — BUPIVACAINE HYDROCHLORIDE 5 ML: 2.5 INJECTION, SOLUTION EPIDURAL; INFILTRATION; INTRACAUDAL at 00:30

## 2021-03-18 RX ADMIN — Medication 87.3 MILLI-UNITS/MIN: at 04:10

## 2021-03-18 RX ADMIN — HYDROCODONE BITARTRATE AND ACETAMINOPHEN 1 TABLET: 5; 325 TABLET ORAL at 11:28

## 2021-03-18 RX ADMIN — HYDROCODONE BITARTRATE AND ACETAMINOPHEN 1 TABLET: 5; 325 TABLET ORAL at 05:02

## 2021-03-18 RX ADMIN — LABETALOL HYDROCHLORIDE 400 MG: 200 TABLET, FILM COATED ORAL at 06:59

## 2021-03-18 RX ADMIN — IBUPROFEN 800 MG: 800 TABLET, FILM COATED ORAL at 22:05

## 2021-03-18 RX ADMIN — HYDROCODONE BITARTRATE AND ACETAMINOPHEN 1 TABLET: 5; 325 TABLET ORAL at 20:40

## 2021-03-18 RX ADMIN — PENICILLIN G 3 MILLION UNITS: 3000000 INJECTION, SOLUTION INTRAVENOUS at 00:33

## 2021-03-18 RX ADMIN — PRENATAL VIT W/ FE FUMARATE-FA TAB 27-0.8 MG 1 TABLET: 27-0.8 TAB at 08:05

## 2021-03-18 RX ADMIN — BUPIVACAINE HYDROCHLORIDE 5 ML: 2.5 INJECTION, SOLUTION EPIDURAL; INFILTRATION; INTRACAUDAL at 00:22

## 2021-03-18 RX ADMIN — IBUPROFEN 800 MG: 800 TABLET, FILM COATED ORAL at 05:02

## 2021-03-18 RX ADMIN — LABETALOL HYDROCHLORIDE 400 MG: 200 TABLET, FILM COATED ORAL at 20:38

## 2021-03-18 RX ADMIN — IBUPROFEN 800 MG: 800 TABLET, FILM COATED ORAL at 13:36

## 2021-03-18 NOTE — PROCEDURES
DELIVERY NOTE    PREOPERATIVE DIAGNOSIS: Intrauterine pregnancy at 37weeks gestation, pregnancy-induced hypertension, previous  section    POSTOPERATIVE DIAGNOSIS: Same, delivered    PROCEDURE:  Spontaneous vaginal delivery  Electronic fetal monitoring    ANESTHESIA: Epidural    SURGEON: Monie Mike M.D.    ASSISTANT:N/A    COMPLICATIONS: None    CONDITION DURING PROCEDURE: Stable    ESTIMATED BLOOD LOSS: 250 mL    SPECIMEN: None    FINDINGS: Viable male infant, cephalic presentation, OA. Apgar's: 8/9. Weight: 5 pounds 14 ounces. Intact placenta. Three-vessel cord. DESCRIPTION OF PROCEDURE: 71-year-old black female  7 para 6-0-0-6, estimated gestational age 42 weeks, with prenatal care at [facility] notable for history of prior  section, pregnancy-induced hypertension, referred to labor and delivery for delivery secondary to elevated blood pressures, received a Cook balloon overnight, followed by an epidural anesthetic and subsequent replacement of the Margareth Jiménez balloon, AROM clear fluid, progressed to full dilation, in an OA position, with rapid descent and delivery. Infant dried and stimulated below placenta for approximately 60 seconds, cord was clamped and cut and infant was handed to mom for skin to skin. Placenta delivered spontaneously and intact with three-vessel cord. Vagina examined and a superficial subclitoral laceration was noted and repaired with 2-0 Vicryl with epidural anesthesia, without difficulty. The patient and infant tolerated the delivery well. All counts were correct.

## 2021-03-18 NOTE — PROGRESS NOTES
Received pt from L&D  Via wheelchair to room 324. Pt oriented to room, call bell and nurse rounding/bedside report. Pt educated on Orthos, paper work, pain scale, pain meds/side effects, and visitor hrs/stay with understanding. V/S obtained. Assessment complete. Pt assisted to bathroom with steady gait and no c/o blurred vision/dizzess. Pt voided lochia tinged urine and educated on pericare with understanding. Pt back to bed. Ice water and lunch box provided. No needs expressed. 0500- Pt c/o abdomen cramps and back pain 6/10. Scheduled Motrin and 1 Norco p.o given. 1863- Rounded. Pt resting in bed. Bp obtained. Pt denies HA, Blurred vision, epigastric pain. IV Pitocin discontinued. IV locked and flushed. Karo Milligan- MD Arlin Encarnacion called and made aware that pt last 2 BP have been elevated at 163/78 and 164/96. Orders to give scheduled dose of Labetalol 400mg now. No other orders given at this time. 0700-Bedside shift report given to LADAN Horn RN. Labetalol 400mg p.o given now per MD order.

## 2021-03-18 NOTE — PROGRESS NOTES
: Shift report received, assume care of pt. Pt drowsy easily aroused, uncomplaining. : Pt reports having pressure. SVE done cervix 7cm dilated, soft bulging bag felt. PP very high and not felt. Pt repostioned to sitting position.  Dr. Torito Kaur at bedside,   AROM done by MD, small amount clear fluid draining. SVE by MD cervix 7cm dilated. 2021  0000: Pt reports increasing pressure and pain from contractions. 0025: VIRGILIO Hu CRNA at bedside, bolus dose given via epidural.   0103: Dr. Torito Kaur at bedside SVE done, cervix fully dilated and effaced PP high pt can start pushing per MD.   0109: Pt had  of a live male , Rn present, MD attending to another delivery. 0115: Skin to skin being done. 0435: Pt transferred to [address] [place] [address] 324 in wheelchair accompanied  By Rn instable condition.  Infant remains in nursery per pt's request. Report given to [address] [place] [address].

## 2021-03-19 VITALS
TEMPERATURE: 97.8 F | RESPIRATION RATE: 18 BRPM | DIASTOLIC BLOOD PRESSURE: 95 MMHG | HEART RATE: 87 BPM | SYSTOLIC BLOOD PRESSURE: 135 MMHG | HEIGHT: 64 IN | WEIGHT: 291.8 LBS | OXYGEN SATURATION: 97 % | BODY MASS INDEX: 49.82 KG/M2

## 2021-03-19 LAB — T PALLIDUM AB SER QL IA: REACTIVE

## 2021-03-19 PROCEDURE — 74011250637 HC RX REV CODE- 250/637: Performed by: OBSTETRICS & GYNECOLOGY

## 2021-03-19 RX ORDER — LABETALOL 200 MG/1
400 TABLET, FILM COATED ORAL 2 TIMES DAILY
Qty: 60 TAB | Refills: 0 | Status: SHIPPED | OUTPATIENT
Start: 2021-03-19

## 2021-03-19 RX ORDER — IBUPROFEN 800 MG/1
800 TABLET ORAL
Qty: 90 TAB | Refills: 0 | Status: SHIPPED | OUTPATIENT
Start: 2021-03-19 | End: 2022-06-28 | Stop reason: ALTCHOICE

## 2021-03-19 RX ADMIN — IBUPROFEN 800 MG: 800 TABLET, FILM COATED ORAL at 05:51

## 2021-03-19 RX ADMIN — IBUPROFEN 800 MG: 800 TABLET, FILM COATED ORAL at 13:29

## 2021-03-19 RX ADMIN — PRENATAL VIT W/ FE FUMARATE-FA TAB 27-0.8 MG 1 TABLET: 27-0.8 TAB at 08:09

## 2021-03-19 RX ADMIN — LABETALOL HYDROCHLORIDE 400 MG: 200 TABLET, FILM COATED ORAL at 08:09

## 2021-03-19 RX ADMIN — HYDROCODONE BITARTRATE AND ACETAMINOPHEN 1 TABLET: 5; 325 TABLET ORAL at 05:53

## 2021-03-19 NOTE — PROGRESS NOTES
1950 rounded, infant in arms, bp assessed, pt denies any needs    2030 rounded, assessment completed, provided pt with prn percocet and scheduled labetalol, provided water, pt denies any other needs    2200 provided pt with scheduled motrin, pt denies any other needs    0006 rounded, VS obtained, provided snacks, pt denies any needs    0200 pt sleeping    0420 pt sleeping    0550 rounded, pt awakened by writer, provided pain medications and water, pt denies any other needs    0700 reported off to [employer], provided pads

## 2021-03-19 NOTE — DISCHARGE SUMMARY
Antepartum Discharge Summary     Name: Olesya Noe MRN: [id number]  SSN: [id number]    YOB: 1985  Age: 30 y.o. Sex: female      Allergies: Patient has no known allergies. Admit Date: 3/16/2021    Discharge Date: 3/19/2021     Admitting Physician: Linda Trinidad MD     Attending Physician:  Myra Linares MD     * Admission Diagnoses: Pregnancy [Z34.90]    * Discharge Diagnoses:   Hospital Problems as of 3/19/2021 Date Reviewed: 3/16/2021          Codes Class Noted - Resolved POA    37 weeks gestation of pregnancy ICD-10-CM: Z3A.37  ICD-9-CM: V22.2  3/16/2021 - Present Unknown        Obesity affecting pregnancy in third trimester ICD-10-CM: O99.213  ICD-9-CM: 649.13  3/16/2021 - Present Unknown        Prenatal care insufficient, third trimester ICD-10-CM: O09.33  ICD-9-CM: V23.7  3/16/2021 - Present Unknown        Multiparous ICD-10-CM: Z64.1  ICD-9-CM: V61.5  3/16/2021 - Present Unknown        HTN in pregnancy, chronic ICD-10-CM: O10.919  ICD-9-CM: 642.00  3/16/2021 - Present Unknown        History of  section ICD-10-CM: Z98.891  ICD-9-CM: V45.89  3/16/2021 - Present Unknown        , delivered, current hospitalization ICD-10-CM: O34.219  ICD-9-CM: 654.21  3/16/2021 - Present Unknown        History of group B Streptococcus (GBS) infection ICD-10-CM: Z86.19  ICD-9-CM: V12.09  3/16/2021 - Present Unknown        RESOLVED: Pregnancy ICD-10-CM: Z34.90  ICD-9-CM: V22.2  2018 - 3/18/2021 Unknown             Lab Results   Component Value Date/Time    ABO/Rh(D) O Positive 2021 05:30 PM    Rubella, External Immune 2018    ABO,Rh O positive  2018    There is no immunization history for the selected administration types on file for this patient.     * Discharge Condition: stable    * Procedures:   * No surgery found [facility] Course:    - normal intrapartum and postpartum course    * Disposition: Home    Discharge Medications:   Current Discharge Medication List          * Follow-up Care/Patient Instructions:  Activity: Activity as tolerated and No sex for 6 weeks  Diet: Regular Diet  Wound Care: None needed    Follow-up Information     Follow up With Specialties Details Why Contact Info    Ignacio Jasmine MD Obstetrics & Gynecology   [address] [place] [id number] [phone]

## 2021-03-19 NOTE — PROGRESS NOTES
[id number]: rupali in nursery aware RPR results. 0940: reports previously received flu vaccine    1535: Discharge plan of care/case management plan validated with provider discharge order. Discharge instructions reviewed. Rx for motrin and labetalol sent to pharmacy on file. Patient aware purpose and side effects of meds. Patient aware when and how to call md after discharge. Patient aware follow up date, time and location. Patient denies history of depression or post partum depression. Aware signs and symptoms of depression to call md regarding after discharge. No questions. States understanding. 1540: Patient discharged via wheelchair to [facility]. Baby remains in Nursery.  Belongings with patient

## 2021-03-20 LAB
BACTERIA SPEC CULT: NORMAL
SPECIAL REQUESTS,SREQ: NORMAL

## 2022-03-18 PROBLEM — Z64.1 MULTIPAROUS: Status: ACTIVE | Noted: 2021-03-16

## 2022-03-18 PROBLEM — O99.213 OBESITY AFFECTING PREGNANCY IN THIRD TRIMESTER: Status: ACTIVE | Noted: 2021-03-16

## 2022-03-18 PROBLEM — O09.33 PRENATAL CARE INSUFFICIENT, THIRD TRIMESTER: Status: ACTIVE | Noted: 2021-03-16

## 2022-03-18 PROBLEM — O10.919 HTN IN PREGNANCY, CHRONIC: Status: ACTIVE | Noted: 2021-03-16

## 2022-03-19 PROBLEM — Z3A.37 37 WEEKS GESTATION OF PREGNANCY: Status: ACTIVE | Noted: 2021-03-16

## 2022-03-20 PROBLEM — Z98.891 HISTORY OF CESAREAN SECTION: Status: ACTIVE | Noted: 2021-03-16

## 2022-03-20 PROBLEM — O34.219 VBAC, DELIVERED, CURRENT HOSPITALIZATION: Status: ACTIVE | Noted: 2021-03-16

## 2022-03-20 PROBLEM — Z86.19 HISTORY OF GROUP B STREPTOCOCCUS (GBS) INFECTION: Status: ACTIVE | Noted: 2021-03-16

## 2022-06-07 ENCOUNTER — TELEPHONE (OUTPATIENT)
Dept: OBGYN CLINIC | Age: 37
End: 2022-06-07

## 2022-06-07 NOTE — TELEPHONE ENCOUNTER
Patient called stating she has had a headache for the last 2 days. Questioned further and she states it is a frontal headache. She states she has a mold issue in her home and doesn't know if that is the cause. She also states a history of elevated blood pressure. Advised the patient her blood pressure needs to be checked. She states she has transportation issues and will go the [facility] or [facility] down the street from her house and call be back with the reading. She is also questioning if she can take Tylenol PM and was advised she may.

## 2022-06-28 ENCOUNTER — TELEPHONE (OUTPATIENT)
Dept: OBGYN CLINIC | Age: 37
End: 2022-06-28

## 2022-06-28 ENCOUNTER — INITIAL PRENATAL (OUTPATIENT)
Dept: OBGYN CLINIC | Age: 37
End: 2022-06-28
Payer: MEDICAID

## 2022-06-28 ENCOUNTER — HOSPITAL ENCOUNTER (INPATIENT)
Age: 37
LOS: 3 days | Discharge: LEFT BEFORE DISCHARGE | DRG: 540 | End: 2022-07-01
Attending: OBSTETRICS & GYNECOLOGY | Admitting: OBSTETRICS & GYNECOLOGY
Payer: MEDICAID

## 2022-06-28 VITALS — BODY MASS INDEX: 53.04 KG/M2 | DIASTOLIC BLOOD PRESSURE: 139 MMHG | SYSTOLIC BLOOD PRESSURE: 224 MMHG | WEIGHT: 293 LBS

## 2022-06-28 DIAGNOSIS — O11.9 CHRONIC HYPERTENSION WITH SUPERIMPOSED PREECLAMPSIA: ICD-10-CM

## 2022-06-28 DIAGNOSIS — Z34.83 PRENATAL CARE, SUBSEQUENT PREGNANCY, THIRD TRIMESTER: Primary | ICD-10-CM

## 2022-06-28 PROBLEM — O09.33 LATE PRENATAL CARE AFFECTING PREGNANCY IN THIRD TRIMESTER: Status: ACTIVE | Noted: 2022-06-28

## 2022-06-28 PROBLEM — Z34.90 PREGNANCY: Status: ACTIVE | Noted: 2022-06-28

## 2022-06-28 PROBLEM — O09.523 ADVANCED MATERNAL AGE IN MULTIGRAVIDA, THIRD TRIMESTER: Status: ACTIVE | Noted: 2022-06-28

## 2022-06-28 PROBLEM — I10 HYPERTENSION: Status: ACTIVE | Noted: 2022-06-28

## 2022-06-28 PROBLEM — Z98.891 HISTORY OF VBAC: Status: ACTIVE | Noted: 2022-06-28

## 2022-06-28 LAB
ABO + RH BLD: NORMAL
ALBUMIN SERPL-MCNC: 2.5 G/DL (ref 3.5–5)
ALBUMIN/GLOB SERPL: 0.6 {RATIO} (ref 1.1–2.2)
ALP SERPL-CCNC: 137 U/L (ref 45–117)
ALT SERPL-CCNC: 14 U/L (ref 12–78)
AMPHET UR QL SCN: NEGATIVE
ANION GAP SERPL CALC-SCNC: 6 MMOL/L (ref 5–15)
APPEARANCE UR: ABNORMAL
AST SERPL W P-5'-P-CCNC: 19 U/L (ref 15–37)
BACTERIA URNS QL MICRO: NEGATIVE /HPF
BARBITURATES UR QL SCN: NEGATIVE
BASOPHILS # BLD: 0.1 K/UL (ref 0–0.1)
BASOPHILS NFR BLD: 0 % (ref 0–1)
BENZODIAZ UR QL: NEGATIVE
BILIRUB SERPL-MCNC: 0.5 MG/DL (ref 0.2–1)
BILIRUB UR QL: NEGATIVE
BLOOD GROUP ANTIBODIES SERPL: NEGATIVE
BUN SERPL-MCNC: 12 MG/DL (ref 6–20)
BUN/CREAT SERPL: 9 (ref 12–20)
CA-I BLD-MCNC: 8.6 MG/DL (ref 8.5–10.1)
CANNABINOIDS UR QL SCN: NEGATIVE
CHLORIDE SERPL-SCNC: 108 MMOL/L (ref 97–108)
CO2 SERPL-SCNC: 25 MMOL/L (ref 21–32)
COCAINE UR QL SCN: NEGATIVE
COLOR UR: ABNORMAL
CREAT SERPL-MCNC: 1.37 MG/DL (ref 0.55–1.02)
CREAT UR-MCNC: 229 MG/DL
DIFFERENTIAL METHOD BLD: ABNORMAL
DRUG SCRN COMMENT,DRGCM: NORMAL
EOSINOPHIL # BLD: 0.2 K/UL (ref 0–0.4)
EOSINOPHIL NFR BLD: 1 % (ref 0–7)
ERYTHROCYTE [DISTWIDTH] IN BLOOD BY AUTOMATED COUNT: 15.8 % (ref 11.5–14.5)
GLOBULIN SER CALC-MCNC: 4.5 G/DL (ref 2–4)
GLUCOSE SERPL-MCNC: 104 MG/DL (ref 65–100)
GLUCOSE UR STRIP.AUTO-MCNC: 50 MG/DL
HCT VFR BLD AUTO: 35.5 % (ref 35–47)
HGB BLD-MCNC: 12 G/DL (ref 11.5–16)
HGB UR QL STRIP: ABNORMAL
HIV1 P24 AG SERPL QL IA: NONREACTIVE
HIV1+2 AB SERPL QL IA: NONREACTIVE
HYALINE CASTS URNS QL MICRO: ABNORMAL /LPF (ref 0–5)
IMM GRANULOCYTES # BLD AUTO: 0.1 K/UL (ref 0–0.04)
IMM GRANULOCYTES NFR BLD AUTO: 1 % (ref 0–0.5)
KETONES UR QL STRIP.AUTO: NEGATIVE MG/DL
LEUKOCYTE ESTERASE UR QL STRIP.AUTO: NEGATIVE
LYMPHOCYTES # BLD: 2.8 K/UL (ref 0.8–3.5)
LYMPHOCYTES NFR BLD: 22 % (ref 12–49)
MCH RBC QN AUTO: 28.4 PG (ref 26–34)
MCHC RBC AUTO-ENTMCNC: 33.8 G/DL (ref 30–36.5)
MCV RBC AUTO: 83.9 FL (ref 80–99)
METHADONE UR QL: NEGATIVE
MONOCYTES # BLD: 1.1 K/UL (ref 0–1)
MONOCYTES NFR BLD: 8 % (ref 5–13)
MUCOUS THREADS URNS QL MICRO: ABNORMAL /LPF
NEUTS SEG # BLD: 8.8 K/UL (ref 1.8–8)
NEUTS SEG NFR BLD: 68 % (ref 32–75)
NITRITE UR QL STRIP.AUTO: NEGATIVE
NRBC # BLD: 0 K/UL (ref 0–0.01)
NRBC BLD-RTO: 0 PER 100 WBC
OPIATES UR QL: NEGATIVE
PCP UR QL: NEGATIVE
PH UR STRIP: 5 [PH] (ref 5–8)
PLATELET # BLD AUTO: 128 K/UL (ref 150–400)
POTASSIUM SERPL-SCNC: 4 MMOL/L (ref 3.5–5.1)
PROT SERPL-MCNC: 7 G/DL (ref 6.4–8.2)
PROT UR STRIP-MCNC: >300 MG/DL
PROT UR-MCNC: 1556 MG/DL (ref 0–11.9)
PROT/CREAT UR-RTO: 6.8
RBC # BLD AUTO: 4.23 M/UL (ref 3.8–5.2)
RBC #/AREA URNS HPF: >100 /HPF (ref 0–5)
SODIUM SERPL-SCNC: 139 MMOL/L (ref 136–145)
SP GR UR REFRACTOMETRY: 1.02 (ref 1–1.03)
SPECIMEN EXP DATE BLD: NORMAL
UROBILINOGEN UR QL STRIP.AUTO: 0.1 EU/DL (ref 0.1–1)
WBC # BLD AUTO: 12.9 K/UL (ref 3.6–11)
WBC URNS QL MICRO: ABNORMAL /HPF (ref 0–4)

## 2022-06-28 PROCEDURE — 86780 TREPONEMA PALLIDUM: CPT

## 2022-06-28 PROCEDURE — 65410000002 HC RM PRIVATE OB

## 2022-06-28 PROCEDURE — 87086 URINE CULTURE/COLONY COUNT: CPT

## 2022-06-28 PROCEDURE — 99284 EMERGENCY DEPT VISIT MOD MDM: CPT

## 2022-06-28 PROCEDURE — 74011250636 HC RX REV CODE- 250/636: Performed by: OBSTETRICS & GYNECOLOGY

## 2022-06-28 PROCEDURE — 87389 HIV-1 AG W/HIV-1&-2 AB AG IA: CPT

## 2022-06-28 PROCEDURE — 36415 COLL VENOUS BLD VENIPUNCTURE: CPT

## 2022-06-28 PROCEDURE — 87536 HIV-1 QUANT&REVRSE TRNSCRPJ: CPT

## 2022-06-28 PROCEDURE — 84156 ASSAY OF PROTEIN URINE: CPT

## 2022-06-28 PROCEDURE — 86706 HEP B SURFACE ANTIBODY: CPT

## 2022-06-28 PROCEDURE — 74011250636 HC RX REV CODE- 250/636

## 2022-06-28 PROCEDURE — 80307 DRUG TEST PRSMV CHEM ANLYZR: CPT

## 2022-06-28 PROCEDURE — 74011000258 HC RX REV CODE- 258: Performed by: OBSTETRICS & GYNECOLOGY

## 2022-06-28 PROCEDURE — 86593 SYPHILIS TEST NON-TREP QUANT: CPT

## 2022-06-28 PROCEDURE — 81001 URINALYSIS AUTO W/SCOPE: CPT

## 2022-06-28 PROCEDURE — 86900 BLOOD TYPING SEROLOGIC ABO: CPT

## 2022-06-28 PROCEDURE — 86803 HEPATITIS C AB TEST: CPT

## 2022-06-28 PROCEDURE — 0502F SUBSEQUENT PRENATAL CARE: CPT | Performed by: OBSTETRICS & GYNECOLOGY

## 2022-06-28 PROCEDURE — 86592 SYPHILIS TEST NON-TREP QUAL: CPT

## 2022-06-28 PROCEDURE — 85025 COMPLETE CBC W/AUTO DIFF WBC: CPT

## 2022-06-28 PROCEDURE — 74011250637 HC RX REV CODE- 250/637: Performed by: OBSTETRICS & GYNECOLOGY

## 2022-06-28 PROCEDURE — 80053 COMPREHEN METABOLIC PANEL: CPT

## 2022-06-28 PROCEDURE — MISCGLOBALOB GLOBAL OB: Performed by: OBSTETRICS & GYNECOLOGY

## 2022-06-28 PROCEDURE — 86762 RUBELLA ANTIBODY: CPT

## 2022-06-28 RX ORDER — HYDRALAZINE HYDROCHLORIDE 20 MG/ML
10 INJECTION INTRAMUSCULAR; INTRAVENOUS
Status: COMPLETED | OUTPATIENT
Start: 2022-06-28 | End: 2022-06-28

## 2022-06-28 RX ORDER — LABETALOL HYDROCHLORIDE 5 MG/ML
20 INJECTION, SOLUTION INTRAVENOUS
Status: COMPLETED | OUTPATIENT
Start: 2022-06-28 | End: 2022-06-29

## 2022-06-28 RX ORDER — LABETALOL HYDROCHLORIDE 5 MG/ML
40 INJECTION, SOLUTION INTRAVENOUS
Status: COMPLETED | OUTPATIENT
Start: 2022-06-28 | End: 2022-06-29

## 2022-06-28 RX ORDER — ONDANSETRON 2 MG/ML
4 INJECTION INTRAMUSCULAR; INTRAVENOUS
Status: DISCONTINUED | OUTPATIENT
Start: 2022-06-28 | End: 2022-07-01 | Stop reason: HOSPADM

## 2022-06-28 RX ORDER — HYDRALAZINE HYDROCHLORIDE 20 MG/ML
INJECTION INTRAMUSCULAR; INTRAVENOUS
Status: COMPLETED
Start: 2022-06-28 | End: 2022-06-28

## 2022-06-28 RX ORDER — ACETAMINOPHEN 325 MG/1
650 TABLET ORAL
Status: DISCONTINUED | OUTPATIENT
Start: 2022-06-28 | End: 2022-07-01 | Stop reason: HOSPADM

## 2022-06-28 RX ORDER — PENICILLIN G 3000000 [IU]/50ML
3 INJECTION, SOLUTION INTRAVENOUS EVERY 4 HOURS
Status: DISCONTINUED | OUTPATIENT
Start: 2022-06-29 | End: 2022-06-29

## 2022-06-28 RX ORDER — MAGNESIUM SULFATE HEPTAHYDRATE 40 MG/ML
INJECTION, SOLUTION INTRAVENOUS
Status: COMPLETED
Start: 2022-06-28 | End: 2022-06-28

## 2022-06-28 RX ORDER — MAGNESIUM SULFATE HEPTAHYDRATE 40 MG/ML
2 INJECTION, SOLUTION INTRAVENOUS CONTINUOUS
Status: DISPENSED | OUTPATIENT
Start: 2022-06-29 | End: 2022-07-01

## 2022-06-28 RX ORDER — PENICILLIN G 3000000 [IU]/50ML
3 INJECTION, SOLUTION INTRAVENOUS EVERY 4 HOURS
Status: DISCONTINUED | OUTPATIENT
Start: 2022-06-28 | End: 2022-06-28

## 2022-06-28 RX ORDER — SODIUM CHLORIDE, SODIUM LACTATE, POTASSIUM CHLORIDE, CALCIUM CHLORIDE 600; 310; 30; 20 MG/100ML; MG/100ML; MG/100ML; MG/100ML
75 INJECTION, SOLUTION INTRAVENOUS CONTINUOUS
Status: DISCONTINUED | OUTPATIENT
Start: 2022-06-28 | End: 2022-07-01 | Stop reason: HOSPADM

## 2022-06-28 RX ORDER — CALCIUM GLUCONATE 20 MG/ML
1 INJECTION, SOLUTION INTRAVENOUS AS NEEDED
Status: DISCONTINUED | OUTPATIENT
Start: 2022-06-28 | End: 2022-07-01 | Stop reason: HOSPADM

## 2022-06-28 RX ORDER — ACETAMINOPHEN 650 MG/1
650 SUPPOSITORY RECTAL
Status: DISCONTINUED | OUTPATIENT
Start: 2022-06-28 | End: 2022-07-01 | Stop reason: HOSPADM

## 2022-06-28 RX ORDER — MAGNESIUM SULFATE HEPTAHYDRATE 40 MG/ML
4 INJECTION, SOLUTION INTRAVENOUS ONCE
Status: COMPLETED | OUTPATIENT
Start: 2022-06-28 | End: 2022-06-28

## 2022-06-28 RX ADMIN — HYDRALAZINE HYDROCHLORIDE 10 MG: 20 INJECTION, SOLUTION INTRAMUSCULAR; INTRAVENOUS at 21:57

## 2022-06-28 RX ADMIN — MAGNESIUM SULFATE HEPTAHYDRATE 4 G: 40 INJECTION, SOLUTION INTRAVENOUS at 22:18

## 2022-06-28 RX ADMIN — HYDRALAZINE HYDROCHLORIDE 10 MG: 20 INJECTION INTRAMUSCULAR; INTRAVENOUS at 21:26

## 2022-06-28 RX ADMIN — LABETALOL HYDROCHLORIDE 300 MG: 200 TABLET, FILM COATED ORAL at 23:15

## 2022-06-28 RX ADMIN — SODIUM CHLORIDE, POTASSIUM CHLORIDE, SODIUM LACTATE AND CALCIUM CHLORIDE 125 ML/HR: 600; 310; 30; 20 INJECTION, SOLUTION INTRAVENOUS at 21:31

## 2022-06-28 RX ADMIN — HYDRALAZINE HYDROCHLORIDE 10 MG: 20 INJECTION, SOLUTION INTRAMUSCULAR; INTRAVENOUS at 21:26

## 2022-06-28 RX ADMIN — MAGNESIUM SULFATE HEPTAHYDRATE 2 G/HR: 40 INJECTION, SOLUTION INTRAVENOUS at 22:18

## 2022-06-28 RX ADMIN — ACETAMINOPHEN 650 MG: 325 TABLET ORAL at 23:50

## 2022-06-28 RX ADMIN — SODIUM CHLORIDE 5 MILLION UNITS: 900 INJECTION INTRAVENOUS at 23:15

## 2022-06-28 NOTE — TELEPHONE ENCOUNTER
Returned the patient's call and she is asking if it is okay for her to wait to go L&D tomorrow morning instead of today. States she doesn't have a . Advised her per Dr Krishna East, she needs to go to L&D today as her blood pressure is extremely high. Patient voiced understanding and states she will find alternate childcare.

## 2022-06-28 NOTE — PROGRESS NOTES
Patient presents as a new OB with dangerously high blood pressure levels. She states she is having contractions.   Patient sent to labor and delivery for further evaluation

## 2022-06-29 ENCOUNTER — ANESTHESIA (OUTPATIENT)
Dept: LABOR AND DELIVERY | Age: 37
DRG: 540 | End: 2022-06-29
Payer: MEDICAID

## 2022-06-29 ENCOUNTER — ANESTHESIA EVENT (OUTPATIENT)
Dept: LABOR AND DELIVERY | Age: 37
DRG: 540 | End: 2022-06-29
Payer: MEDICAID

## 2022-06-29 ENCOUNTER — APPOINTMENT (OUTPATIENT)
Dept: NON INVASIVE DIAGNOSTICS | Age: 37
DRG: 540 | End: 2022-06-29
Attending: INTERNAL MEDICINE
Payer: MEDICAID

## 2022-06-29 PROBLEM — O41.03X0 OLIGOHYDRAMNIOS IN THIRD TRIMESTER: Status: ACTIVE | Noted: 2022-06-29

## 2022-06-29 LAB
ATRIAL RATE: 92 BPM
CALCULATED P AXIS, ECG09: 54 DEGREES
CALCULATED R AXIS, ECG10: -5 DEGREES
CALCULATED T AXIS, ECG11: 25 DEGREES
DIAGNOSIS, 93000: NORMAL
ECHO AO ASC DIAM: 2.8 CM
ECHO AO ASCENDING AORTA INDEX: 1.18 CM/M2
ECHO AO ROOT DIAM: 3.1 CM
ECHO AO ROOT INDEX: 1.3 CM/M2
ECHO AV AREA PEAK VELOCITY: 1.6 CM2
ECHO AV AREA VTI: 1.5 CM2
ECHO AV AREA/BSA PEAK VELOCITY: 0.7 CM2/M2
ECHO AV AREA/BSA VTI: 0.6 CM2/M2
ECHO AV MEAN GRADIENT: 6 MMHG
ECHO AV MEAN VELOCITY: 1.2 M/S
ECHO AV PEAK GRADIENT: 11 MMHG
ECHO AV PEAK VELOCITY: 1.7 M/S
ECHO AV VELOCITY RATIO: 0.47
ECHO AV VTI: 25 CM
ECHO EST RA PRESSURE: 3 MMHG
ECHO LA AREA 2C: 9.8 CM2
ECHO LA AREA 4C: 20.4 CM2
ECHO LA DIAMETER INDEX: 1.43 CM/M2
ECHO LA DIAMETER: 3.4 CM
ECHO LA MAJOR AXIS: 5.5 CM
ECHO LA MINOR AXIS: 4.2 CM
ECHO LA TO AORTIC ROOT RATIO: 1.1
ECHO LV E' LATERAL VELOCITY: 6 CM/S
ECHO LV E' SEPTAL VELOCITY: 6 CM/S
ECHO LV EDV A2C: 85 ML
ECHO LV EDV A4C: 88 ML
ECHO LV EDV INDEX A4C: 37 ML/M2
ECHO LV EDV NDEX A2C: 36 ML/M2
ECHO LV EJECTION FRACTION A2C: 53 %
ECHO LV EJECTION FRACTION A4C: 49 %
ECHO LV EJECTION FRACTION BIPLANE: 53 % (ref 55–100)
ECHO LV ESV A2C: 40 ML
ECHO LV ESV A4C: 45 ML
ECHO LV ESV INDEX A2C: 17 ML/M2
ECHO LV ESV INDEX A4C: 19 ML/M2
ECHO LV FRACTIONAL SHORTENING: 16 % (ref 28–44)
ECHO LV INTERNAL DIMENSION DIASTOLE INDEX: 2.31 CM/M2
ECHO LV INTERNAL DIMENSION DIASTOLIC: 5.5 CM (ref 3.9–5.3)
ECHO LV INTERNAL DIMENSION SYSTOLIC INDEX: 1.93 CM/M2
ECHO LV INTERNAL DIMENSION SYSTOLIC: 4.6 CM
ECHO LV IVSD: 1.3 CM (ref 0.6–0.9)
ECHO LV MASS 2D: 320.9 G (ref 67–162)
ECHO LV MASS INDEX 2D: 134.8 G/M2 (ref 43–95)
ECHO LV POSTERIOR WALL DIASTOLIC: 1.4 CM (ref 0.6–0.9)
ECHO LV RELATIVE WALL THICKNESS RATIO: 0.51
ECHO LVOT AREA: 3.5 CM2
ECHO LVOT AV VTI INDEX: 0.44
ECHO LVOT DIAM: 2.1 CM
ECHO LVOT MEAN GRADIENT: 1 MMHG
ECHO LVOT PEAK GRADIENT: 2 MMHG
ECHO LVOT PEAK VELOCITY: 0.8 M/S
ECHO LVOT STROKE VOLUME INDEX: 16.1 ML/M2
ECHO LVOT SV: 38.4 ML
ECHO LVOT VTI: 11.1 CM
ECHO MV A VELOCITY: 0.9 M/S
ECHO MV AREA VTI: 1.6 CM2
ECHO MV E VELOCITY: 1.04 M/S
ECHO MV E/A RATIO: 1.16
ECHO MV E/E' LATERAL: 17.33
ECHO MV E/E' RATIO (AVERAGED): 17.33
ECHO MV E/E' SEPTAL: 17.33
ECHO MV LVOT VTI INDEX: 2.15
ECHO MV MAX VELOCITY: 1.2 M/S
ECHO MV MEAN GRADIENT: 2 MMHG
ECHO MV MEAN VELOCITY: 0.7 M/S
ECHO MV PEAK GRADIENT: 6 MMHG
ECHO MV REGURGITANT PEAK GRADIENT: 41 MMHG
ECHO MV REGURGITANT PEAK VELOCITY: 3.2 M/S
ECHO MV REGURGITANT VTIA: 82 CM
ECHO MV VTI: 23.9 CM
ECHO PV MAX VELOCITY: 1.1 M/S
ECHO PV MEAN GRADIENT: 3 MMHG
ECHO PV MEAN VELOCITY: 0.7 M/S
ECHO PV PEAK GRADIENT: 5 MMHG
ECHO PV VTI: 17.8 CM
ECHO RIGHT VENTRICULAR SYSTOLIC PRESSURE (RVSP): 36 MMHG
ECHO TV REGURGITANT MAX VELOCITY: 2.89 M/S
ECHO TV REGURGITANT PEAK GRADIENT: 33 MMHG
HBV SURFACE AB SER QL: NONREACTIVE
HBV SURFACE AB SER-ACNC: <3.1 MIU/ML
HCV AB SER IA-ACNC: 0.09 INDEX
HCV AB SERPL QL IA: NONREACTIVE
P-R INTERVAL, ECG05: 152 MS
Q-T INTERVAL, ECG07: 398 MS
QRS DURATION, ECG06: 84 MS
QTC CALCULATION (BEZET), ECG08: 492 MS
RPR SER QL: REACTIVE
RPR SER-TITR: ABNORMAL {TITER}
VENTRICULAR RATE, ECG03: 92 BPM

## 2022-06-29 PROCEDURE — 74011000258 HC RX REV CODE- 258: Performed by: NURSE ANESTHETIST, CERTIFIED REGISTERED

## 2022-06-29 PROCEDURE — 74011000250 HC RX REV CODE- 250: Performed by: ANESTHESIOLOGY

## 2022-06-29 PROCEDURE — 93306 TTE W/DOPPLER COMPLETE: CPT

## 2022-06-29 PROCEDURE — 74011250636 HC RX REV CODE- 250/636: Performed by: OBSTETRICS & GYNECOLOGY

## 2022-06-29 PROCEDURE — 74011000250 HC RX REV CODE- 250: Performed by: OBSTETRICS & GYNECOLOGY

## 2022-06-29 PROCEDURE — 65410000002 HC RM PRIVATE OB

## 2022-06-29 PROCEDURE — 76010000392 HC C SECN EA ADDL 0.5 HR: Performed by: OBSTETRICS & GYNECOLOGY

## 2022-06-29 PROCEDURE — 75410000002 HC LABOR FEE PER 1 HR

## 2022-06-29 PROCEDURE — 76060000078 HC EPIDURAL ANESTHESIA: Performed by: OBSTETRICS & GYNECOLOGY

## 2022-06-29 PROCEDURE — 93005 ELECTROCARDIOGRAM TRACING: CPT

## 2022-06-29 PROCEDURE — 76060000033 HC ANESTHESIA 1 TO 1.5 HR: Performed by: OBSTETRICS & GYNECOLOGY

## 2022-06-29 PROCEDURE — 74011250637 HC RX REV CODE- 250/637: Performed by: OBSTETRICS & GYNECOLOGY

## 2022-06-29 PROCEDURE — 76010000391 HC C SECN FIRST 1 HR: Performed by: OBSTETRICS & GYNECOLOGY

## 2022-06-29 PROCEDURE — 74011250636 HC RX REV CODE- 250/636: Performed by: NURSE ANESTHETIST, CERTIFIED REGISTERED

## 2022-06-29 PROCEDURE — 74011250636 HC RX REV CODE- 250/636: Performed by: ANESTHESIOLOGY

## 2022-06-29 PROCEDURE — 99284 EMERGENCY DEPT VISIT MOD MDM: CPT

## 2022-06-29 PROCEDURE — 77030007866 HC KT SPN ANES BBMI -B: Performed by: ANESTHESIOLOGY

## 2022-06-29 PROCEDURE — 74011250636 HC RX REV CODE- 250/636

## 2022-06-29 RX ORDER — SODIUM CHLORIDE, SODIUM LACTATE, POTASSIUM CHLORIDE, CALCIUM CHLORIDE 600; 310; 30; 20 MG/100ML; MG/100ML; MG/100ML; MG/100ML
INJECTION, SOLUTION INTRAVENOUS
Status: DISCONTINUED | OUTPATIENT
Start: 2022-06-29 | End: 2022-06-29 | Stop reason: HOSPADM

## 2022-06-29 RX ORDER — KETOROLAC TROMETHAMINE 30 MG/ML
15 INJECTION, SOLUTION INTRAMUSCULAR; INTRAVENOUS
Status: DISCONTINUED | OUTPATIENT
Start: 2022-06-29 | End: 2022-06-29

## 2022-06-29 RX ORDER — SIMETHICONE 80 MG
80 TABLET,CHEWABLE ORAL
Status: DISCONTINUED | OUTPATIENT
Start: 2022-06-29 | End: 2022-07-01 | Stop reason: HOSPADM

## 2022-06-29 RX ORDER — OXYCODONE AND ACETAMINOPHEN 5; 325 MG/1; MG/1
2 TABLET ORAL
Status: DISCONTINUED | OUTPATIENT
Start: 2022-06-29 | End: 2022-07-01 | Stop reason: HOSPADM

## 2022-06-29 RX ORDER — SODIUM CHLORIDE, SODIUM LACTATE, POTASSIUM CHLORIDE, CALCIUM CHLORIDE 600; 310; 30; 20 MG/100ML; MG/100ML; MG/100ML; MG/100ML
INJECTION, SOLUTION INTRAVENOUS
Status: DISCONTINUED | OUTPATIENT
Start: 2022-06-29 | End: 2022-06-29

## 2022-06-29 RX ORDER — SODIUM CHLORIDE 0.9 % (FLUSH) 0.9 %
5-40 SYRINGE (ML) INJECTION EVERY 8 HOURS
Status: DISCONTINUED | OUTPATIENT
Start: 2022-06-29 | End: 2022-07-01 | Stop reason: HOSPADM

## 2022-06-29 RX ORDER — KETOROLAC TROMETHAMINE 30 MG/ML
INJECTION, SOLUTION INTRAMUSCULAR; INTRAVENOUS AS NEEDED
Status: DISCONTINUED | OUTPATIENT
Start: 2022-06-29 | End: 2022-06-29 | Stop reason: HOSPADM

## 2022-06-29 RX ORDER — NALOXONE HYDROCHLORIDE 0.4 MG/ML
0.2 INJECTION, SOLUTION INTRAMUSCULAR; INTRAVENOUS; SUBCUTANEOUS
Status: ACTIVE | OUTPATIENT
Start: 2022-06-29 | End: 2022-06-30

## 2022-06-29 RX ORDER — ONDANSETRON 2 MG/ML
INJECTION INTRAMUSCULAR; INTRAVENOUS AS NEEDED
Status: DISCONTINUED | OUTPATIENT
Start: 2022-06-29 | End: 2022-06-29 | Stop reason: HOSPADM

## 2022-06-29 RX ORDER — ZOLPIDEM TARTRATE 5 MG/1
5 TABLET ORAL
Status: DISCONTINUED | OUTPATIENT
Start: 2022-06-29 | End: 2022-07-01 | Stop reason: HOSPADM

## 2022-06-29 RX ORDER — FAMOTIDINE 10 MG/ML
INJECTION INTRAVENOUS AS NEEDED
Status: DISCONTINUED | OUTPATIENT
Start: 2022-06-29 | End: 2022-06-29 | Stop reason: HOSPADM

## 2022-06-29 RX ORDER — OXYTOCIN/RINGER'S LACTATE 30/500 ML
10 PLASTIC BAG, INJECTION (ML) INTRAVENOUS AS NEEDED
Status: DISCONTINUED | OUTPATIENT
Start: 2022-06-29 | End: 2022-07-01 | Stop reason: HOSPADM

## 2022-06-29 RX ORDER — OXYCODONE AND ACETAMINOPHEN 5; 325 MG/1; MG/1
1 TABLET ORAL
Status: DISCONTINUED | OUTPATIENT
Start: 2022-06-29 | End: 2022-07-01 | Stop reason: HOSPADM

## 2022-06-29 RX ORDER — SODIUM CHLORIDE 9 MG/ML
INJECTION, SOLUTION INTRAVENOUS
Status: DISCONTINUED | OUTPATIENT
Start: 2022-06-29 | End: 2022-06-29 | Stop reason: HOSPADM

## 2022-06-29 RX ORDER — LABETALOL HYDROCHLORIDE 5 MG/ML
20 INJECTION, SOLUTION INTRAVENOUS
Status: COMPLETED | OUTPATIENT
Start: 2022-06-29 | End: 2022-06-29

## 2022-06-29 RX ORDER — OXYTOCIN/RINGER'S LACTATE 30/500 ML
87.3 PLASTIC BAG, INJECTION (ML) INTRAVENOUS AS NEEDED
Status: COMPLETED | OUTPATIENT
Start: 2022-06-29 | End: 2022-06-29

## 2022-06-29 RX ORDER — NALBUPHINE HYDROCHLORIDE 10 MG/ML
5 INJECTION, SOLUTION INTRAMUSCULAR; INTRAVENOUS; SUBCUTANEOUS
Status: DISCONTINUED | OUTPATIENT
Start: 2022-06-29 | End: 2022-07-01 | Stop reason: HOSPADM

## 2022-06-29 RX ORDER — MORPHINE SULFATE 0.5 MG/ML
INJECTION, SOLUTION EPIDURAL; INTRATHECAL; INTRAVENOUS
Status: SHIPPED | OUTPATIENT
Start: 2022-06-29 | End: 2022-06-29

## 2022-06-29 RX ORDER — CEFAZOLIN SODIUM 1 G/3ML
INJECTION, POWDER, FOR SOLUTION INTRAMUSCULAR; INTRAVENOUS AS NEEDED
Status: DISCONTINUED | OUTPATIENT
Start: 2022-06-29 | End: 2022-06-29 | Stop reason: HOSPADM

## 2022-06-29 RX ORDER — BUPIVACAINE HYDROCHLORIDE 7.5 MG/ML
INJECTION, SOLUTION INTRASPINAL
Status: SHIPPED | OUTPATIENT
Start: 2022-06-29 | End: 2022-06-29

## 2022-06-29 RX ORDER — SODIUM CHLORIDE 0.9 % (FLUSH) 0.9 %
5-40 SYRINGE (ML) INJECTION AS NEEDED
Status: DISCONTINUED | OUTPATIENT
Start: 2022-06-29 | End: 2022-07-01 | Stop reason: HOSPADM

## 2022-06-29 RX ORDER — DEXAMETHASONE SODIUM PHOSPHATE 4 MG/ML
INJECTION, SOLUTION INTRA-ARTICULAR; INTRALESIONAL; INTRAMUSCULAR; INTRAVENOUS; SOFT TISSUE AS NEEDED
Status: DISCONTINUED | OUTPATIENT
Start: 2022-06-29 | End: 2022-06-29 | Stop reason: HOSPADM

## 2022-06-29 RX ORDER — MORPHINE SULFATE 0.5 MG/ML
INJECTION, SOLUTION EPIDURAL; INTRATHECAL; INTRAVENOUS AS NEEDED
Status: DISCONTINUED | OUTPATIENT
Start: 2022-06-29 | End: 2022-06-29 | Stop reason: HOSPADM

## 2022-06-29 RX ORDER — OXYTOCIN 10 [USP'U]/ML
INJECTION, SOLUTION INTRAMUSCULAR; INTRAVENOUS AS NEEDED
Status: DISCONTINUED | OUTPATIENT
Start: 2022-06-29 | End: 2022-06-29 | Stop reason: HOSPADM

## 2022-06-29 RX ORDER — OXYTOCIN/RINGER'S LACTATE 30/500 ML
PLASTIC BAG, INJECTION (ML) INTRAVENOUS
Status: COMPLETED
Start: 2022-06-29 | End: 2022-06-29

## 2022-06-29 RX ADMIN — SODIUM CHLORIDE, POTASSIUM CHLORIDE, SODIUM LACTATE AND CALCIUM CHLORIDE: 600; 310; 30; 20 INJECTION, SOLUTION INTRAVENOUS at 01:25

## 2022-06-29 RX ADMIN — OXYCODONE AND ACETAMINOPHEN 2 TABLET: 5; 325 TABLET ORAL at 19:33

## 2022-06-29 RX ADMIN — MORPHINE SULFATE 0.15 MG: 0.5 INJECTION, SOLUTION EPIDURAL; INTRATHECAL; INTRAVENOUS at 01:35

## 2022-06-29 RX ADMIN — ONDANSETRON 4 MG: 2 INJECTION INTRAMUSCULAR; INTRAVENOUS at 13:37

## 2022-06-29 RX ADMIN — MAGNESIUM SULFATE HEPTAHYDRATE 2 G/HR: 40 INJECTION, SOLUTION INTRAVENOUS at 08:06

## 2022-06-29 RX ADMIN — Medication 87.3 MILLI-UNITS/MIN: at 03:43

## 2022-06-29 RX ADMIN — SODIUM CHLORIDE: 9 INJECTION, SOLUTION INTRAVENOUS at 01:58

## 2022-06-29 RX ADMIN — PENICILLIN G 3 MILLION UNITS: 3000000 INJECTION, SOLUTION INTRAVENOUS at 04:08

## 2022-06-29 RX ADMIN — OXYTOCIN 30 UNITS: 10 INJECTION, SOLUTION INTRAMUSCULAR; INTRAVENOUS at 01:59

## 2022-06-29 RX ADMIN — PENICILLIN G 3 MILLION UNITS: 3000000 INJECTION, SOLUTION INTRAVENOUS at 08:06

## 2022-06-29 RX ADMIN — MORPHINE SULFATE 4.85 MG: 0.5 INJECTION, SOLUTION EPIDURAL; INTRATHECAL; INTRAVENOUS at 01:58

## 2022-06-29 RX ADMIN — LABETALOL HYDROCHLORIDE 40 MG: 5 INJECTION, SOLUTION INTRAVENOUS at 06:00

## 2022-06-29 RX ADMIN — LABETALOL HYDROCHLORIDE 300 MG: 200 TABLET, FILM COATED ORAL at 14:32

## 2022-06-29 RX ADMIN — KETOROLAC TROMETHAMINE 30 MG: 30 INJECTION, SOLUTION INTRAMUSCULAR at 02:17

## 2022-06-29 RX ADMIN — BUPIVACAINE HYDROCHLORIDE 13 MG: 7.5 INJECTION, SOLUTION INTRASPINAL at 01:35

## 2022-06-29 RX ADMIN — FAMOTIDINE 20 MG: 10 INJECTION INTRAVENOUS at 01:40

## 2022-06-29 RX ADMIN — ONDANSETRON 4 MG: 2 INJECTION INTRAMUSCULAR; INTRAVENOUS at 01:40

## 2022-06-29 RX ADMIN — LABETALOL HYDROCHLORIDE 300 MG: 200 TABLET, FILM COATED ORAL at 22:02

## 2022-06-29 RX ADMIN — LABETALOL HYDROCHLORIDE 20 MG: 5 INJECTION, SOLUTION INTRAVENOUS at 00:12

## 2022-06-29 RX ADMIN — DEXAMETHASONE SODIUM PHOSPHATE 4 MG: 4 INJECTION, SOLUTION INTRA-ARTICULAR; INTRALESIONAL; INTRAMUSCULAR; INTRAVENOUS; SOFT TISSUE at 01:40

## 2022-06-29 RX ADMIN — PHENYLEPHRINE HYDROCHLORIDE 75 MCG/MIN: 10 INJECTION INTRAVENOUS at 01:40

## 2022-06-29 RX ADMIN — OXYCODONE AND ACETAMINOPHEN 2 TABLET: 5; 325 TABLET ORAL at 04:46

## 2022-06-29 RX ADMIN — LABETALOL HYDROCHLORIDE 300 MG: 200 TABLET, FILM COATED ORAL at 05:22

## 2022-06-29 RX ADMIN — LABETALOL HYDROCHLORIDE 20 MG: 5 INJECTION, SOLUTION INTRAVENOUS at 05:16

## 2022-06-29 RX ADMIN — CEFAZOLIN SODIUM 3 G: 1 INJECTION, POWDER, FOR SOLUTION INTRAMUSCULAR; INTRAVENOUS at 01:42

## 2022-06-29 NOTE — CONSULTS
Cardiology Consult    NAME: Fabiana Robins   :  1985   MRN:  [id number]     Date/Time:  2022 8:56 AM    Patient PCP: Ayse Shrestha, DO  ________________________________________________________________________     Assessment/Plan:   Uncontrolled hypertension, continue labetalol, may increase dose, can also add p.o. hydralazine if required. Will obtain EKG. Will obtain echo    Preeclampsia    Tobacco use, patient stopped with her pregnancy, agrees to stay off.    ? Noncompliance, risks discussed with patient. Kidney disease, 3/21 creatinine was 0.7        []        High complexity decision making was performed        Subjective:   CHIEF COMPLAINT:     Pregnancy  REASON FOR CONSULT:  Uncontrolled hypertension/preeclampsia    HISTORY OF PRESENT ILLNESS:     Fabiana Robins is a 40 y.o. BLACK/ female who came in for delivery. With uncontrolled hypertension, currently on labetalol, blood pressure is slowly improving. Patient has not been compliant. Says she stopped smoking with her pregnancy. Denies chest pain or shortness of breath. Used to follow-up with Dr. Gabriela Sauer. Since his long-term she has not followed up. Has not had much leg edema except during this pregnancy. Denies any family history of heart problems. Patient also denies any prior cardiac history. Past Medical History:   Diagnosis Date    Diabetes ([address]     HTN in pregnancy, chronic 3/16/2021    Syphilis     treated in May    Trichimoniasis 2018    treated in this pregnancy      Past Surgical History:   Procedure Laterality Date    HX  SECTION       No Known Allergies   Meds:  See below  Social History     Tobacco Use    Smoking status: Former Smoker     Packs/day: 0.25     Years: 2.00     Pack years: 0.50    Smokeless tobacco: Never Used   Substance Use Topics    Alcohol use: No      No family history on file.     REVIEW OF SYSTEMS:     []         Unable to obtain  ROS due to ---   [x]         Total of 12 systems reviewed as follows:    Constitutional: negative fever, negative chills, negative weight loss  Eyes:   negative visual changes  ENT:   negative sore throat, tongue or lip swelling  Respiratory:  negative cough, negative dyspnea  Cards:  See HPI  GI:   negative for nausea, vomiting, diarrhea, and abdominal pain  Genitourinary: negative for frequency, dysuria  Integument:  negative for rash   Hematologic:  negative for easy bruising and gum/nose bleeding  Musculoskel: negative for myalgias,  back pain  Neurological:  negative for headaches, dizziness, vertigo, weakness  Behavl/Psych: negative for feelings of anxiety, depression     Pertinent Positives include :    Objective:      Physical Exam:    Last 24hrs VS reviewed since prior progress note. Most recent are:    Visit Vitals  BP (!) 158/90   Pulse 81   Temp 98 °F (36.7 °C)   Resp 16   Ht 5' 5\" (1.651 m)   Wt 140.2 kg (309 lb)   SpO2 98%   Breastfeeding Unknown   BMI 51.42 kg/m²       Intake/Output Summary (Last 24 hours) at 6/29/2022 0856  Last data filed at 6/29/2022 0700  Gross per 24 hour   Intake 1000 ml   Output 1325 ml   Net -325 ml        General Appearance: Overweight female, alert, no acute distress. Ears/Nose/Mouth/Throat: Pupils equal and round, Hearing grossly normal.  Neck: Supple. JVP within normal limits. Carotids good upstrokes, with no bruit. Chest: Lungs clear to auscultation bilaterally. Cardiovascular: Regular rate and rhythm, S1S2 normal, no murmur, rubs, gallops. Abdomen: Soft, non-tender, bowel sounds are active. No organomegaly. Extremities: No edema bilaterally. Femoral pulses +2, Distal Pulses +1. Skin: Warm and dry. Neuro: Alert , normal speech; follows simple commands  Psychiatric: Cooperative, normal affect and judgment    []         Post-cath site without hematoma, bruit, tenderness, or thrill. Distal pulses intact.     Data:      Telemetry:    EKG:  []  No new EKG for review. Prior to Admission medications    Medication Sig Start Date End Date Taking? Authorizing Provider   labetaloL (NORMODYNE) 200 mg tablet Take 2 Tabs by mouth two (2) times a day. Indications: high blood pressure 3/19/21  Yes Gilda Alaniz MD   oxyCODONE-acetaminophen (PERCOCET) 5-325 mg per tablet Take 1 Tab by mouth every four (4) hours as needed. Max Daily Amount: 6 Tabs. Patient not taking: Reported on 6/28/2022 8/31/18   Eual Severance, MD       Recent Results (from the past 24 hour(s))   CBC WITH AUTOMATED DIFF    Collection Time: 06/28/22  8:58 PM   Result Value Ref Range    WBC 12.9 (H) 3.6 - 11.0 K/uL    RBC 4.23 3.80 - 5.20 M/uL    HGB 12.0 11.5 - 16.0 g/dL    HCT 35.5 35.0 - 47.0 %    MCV 83.9 80.0 - 99.0 FL    MCH 28.4 26.0 - 34.0 PG    MCHC 33.8 30.0 - 36.5 g/dL    RDW 15.8 (H) 11.5 - 14.5 %    PLATELET 685 (L) 387 - 400 K/uL    NRBC 0.0 0.0  WBC    ABSOLUTE NRBC 0.00 0.00 - 0.01 K/uL    NEUTROPHILS 68 32 - 75 %    LYMPHOCYTES 22 12 - 49 %    MONOCYTES 8 5 - 13 %    EOSINOPHILS 1 0 - 7 %    BASOPHILS 0 0 - 1 %    IMMATURE GRANULOCYTES 1 (H) 0 - 0.5 %    ABS. NEUTROPHILS 8.8 (H) 1.8 - 8.0 K/UL    ABS. LYMPHOCYTES 2.8 0.8 - 3.5 K/UL    ABS. MONOCYTES 1.1 (H) 0.0 - 1.0 K/UL    ABS. EOSINOPHILS 0.2 0.0 - 0.4 K/UL    ABS. BASOPHILS 0.1 0.0 - 0.1 K/UL    ABS. IMM.  GRANS. 0.1 (H) 0.00 - 0.04 K/UL    DF AUTOMATED     METABOLIC PANEL, COMPREHENSIVE    Collection Time: 06/28/22  8:58 PM   Result Value Ref Range    Sodium 139 136 - 145 mmol/L    Potassium 4.0 3.5 - 5.1 mmol/L    Chloride 108 97 - 108 mmol/L    CO2 25 21 - 32 mmol/L    Anion gap 6 5 - 15 mmol/L    Glucose 104 (H) 65 - 100 mg/dL    BUN 12 6 - 20 mg/dL    Creatinine 1.37 (H) 0.55 - 1.02 mg/dL    BUN/Creatinine ratio 9 (L) 12 - 20      GFR est AA 53 (L) >60 ml/min/1.73m2    GFR est non-AA 44 (L) >60 ml/min/1.73m2    Calcium 8.6 8.5 - 10.1 mg/dL    Bilirubin, total 0.5 0.2 - 1.0 mg/dL    AST (SGOT) 19 15 - 37 U/L    ALT (SGPT) 14 12 - 78 U/L    Alk. phosphatase 137 (H) 45 - 117 U/L    Protein, total 7.0 6.4 - 8.2 g/dL    Albumin 2.5 (L) 3.5 - 5.0 g/dL    Globulin 4.5 (H) 2.0 - 4.0 g/dL    A-G Ratio 0.6 (L) 1.1 - 2.2     TYPE & SCREEN    Collection Time: 06/28/22  8:58 PM   Result Value Ref Range    Crossmatch Expiration 07/01/2022,2359     ABO/Rh(D) Baljit Sharps Positive     Antibody screen Negative    HIV-1,2 P24 AG/AB SCREEN    Collection Time: 06/28/22  8:58 PM   Result Value Ref Range    p24 Antigen Nonreactive Nonreactive      HIV-1,2 Ab Nonreactive Nonreactive     URINALYSIS W/MICROSCOPIC    Collection Time: 06/28/22  9:40 PM   Result Value Ref Range    Color Tiffany      Appearance Turbid (A) Clear      Specific gravity 1.022 1.003 - 1.030      pH (UA) 5.0 5.0 - 8.0      Protein >300 (A) Negative mg/dL    Glucose 50 (A) Negative mg/dL    Ketone Negative Negative mg/dL    Bilirubin Negative Negative      Blood Large (A) Negative      Urobilinogen 0.1 0.1 - 1.0 EU/dL    Nitrites Negative Negative      Leukocyte Esterase Negative Negative      WBC  0 - 4 /hpf    RBC >100 (H) 0 - 5 /hpf    Bacteria Negative Negative /hpf    Mucus Trace (A) Negative /lpf    Hyaline cast 10-20 0 - 5 /lpf   DRUG SCREEN, URINE    Collection Time: 06/28/22  9:40 PM   Result Value Ref Range    AMPHETAMINES Negative Negative      BARBITURATES Negative Negative      BENZODIAZEPINES Negative Negative      COCAINE Negative Negative      METHADONE Negative Negative      OPIATES Negative Negative      PCP(PHENCYCLIDINE) Negative Negative      THC (TH-CANNABINOL) Negative Negative      Drug screen comment        This test is a screen for drugs of abuse in a medical setting only (i.e., they are unconfirmed results and as such must not be used for non-medical purposes, e.g.,employment testing, legal testing). Due to its inherent nature, false positive (FP) and false negative (FN) results may be obtained.  Therefore, if necessary for medical care, recommend confirmation of positive findings by GC/MS. PROTEIN/CREATININE RATIO, URINE    Collection Time: 06/28/22  9:40 PM   Result Value Ref Range    Protein, urine random 1,556 (H) 0.0 - 11.9 mg/dL    Creatinine, urine 229.00 mg/dL    Protein/Creat.  urine Ratio 6.8          Renetta Garcia MD

## 2022-06-29 NOTE — PROGRESS NOTES
: Pt ambulated to unit alone, escorted to LD 8. Pt was seen in the office today and was sent for elevated bps. Pt states she didn't have  and had to sort that out hence why she just coming. Pt to bathroom to change into gown and pass urine. : Pt placed on monitor, EFM and triage questions commence. Pt was seen in the office today for the first time today other than an ultrasound that she had 3 weeks ago. She reports that she thinks she has a UTI because she is having pain with urination and blood in her urine. She also reports having contractions since about 4 days ago. Pt is known hypertensive but reports being noncompliant with medication, last took about 2-3 days ago. : Dr. Paxton Cardenas on unit in a delivery informed about pt's arrival and Bps. Orders received for lab work, MD will see pt soon. : Dr. Paxton Cardenas at bedside talking with pt, obtaining information, updated on BPs and discussing plan of care. Pt reports LMP 21 which puts ROME at 22. MD had to rush from bedside to attend to another delivery. : DR. Paxton Cardenas returned to bedside and continue assessment. : SVE by MD cervix posterior, high and closed. plan of care for Iv antihypertensive, magnesium sulfate,  and c/section dicussed. : Bedside ultrasound done by MD to confirm presentation, same cephalic. 4: Dr. Paxton Cardenas updated about pt, Bps and strip reviewed. Lab result read to MD. Orders received  For PCN and po labetalol. 09550: Dr Paxton Cardenas updated about pt, Bps  And strip reviewed with MD. Pt now complaining of headache. Order received for IV labetalol. 0036: Paxton Palomino updated about strip, variable and late decels continue, Bps also reviewed. Pt for c/section. CRNA currently doing epidural in another room. 0040: CHIQUITA [facility] CRNA informed about c/section. 1252: Pt updated re plan of care an for c/section.      7293: Pt clipped   0113: DR. Jasmin Shen at bedside obtaining anesthesia consent. 0124: Pt off monitor to OR  0138: FHR with doppler 142-145  0237: Back to Room following repeat c/section, uneventful procedure. Recovery commence. 1197: Dr. Ivan Manning updated about pt's Bps, order received for IV labetalol 20mg and give po dose due at 6am.     0558: MD updated about bps remaining elevated order received for IV labetalol 40mg. 0645: Dr. Ivan Manning at [address] [place] [address] reviewed. MD states will refer pt for cardiology consult. 0715: Bedside shift report given to CHIQUITA Strauss and Leena Gonzales Rns

## 2022-06-29 NOTE — PROGRESS NOTES
6295 - Dr. Wiliam Reeder called to unit. Update to plan of care. Consult to cardiology placed - pt to be seen by Dr. Hal Hayes today. Idania Hayes MD on unit to assess pt.

## 2022-06-29 NOTE — ANESTHESIA PREPROCEDURE EVALUATION
Relevant Problems   RESPIRATORY SYSTEM   (+) History of group B Streptococcus (GBS) infection      CARDIOVASCULAR   (+) Chronic hypertension with superimposed preeclampsia   (+) HTN in pregnancy, chronic   (+) Hypertension      ENDOCRINE   (+) Obesity affecting pregnancy in third trimester       Anesthetic History   No history of anesthetic complications            Review of Systems / Medical History  Patient summary reviewed, nursing notes reviewed and pertinent labs reviewed    Pulmonary          Smoker         Neuro/Psych   Within defined limits           Cardiovascular    Hypertension: well controlled              Exercise tolerance: >4 METS     GI/Hepatic/Renal     GERD: well controlled           Endo/Other    Diabetes    Morbid obesity    Comments: > 38 WEEKS GESTATION. Other Findings   Comments: CH. HTN WITH SUPERIMPOSED PIH.           Physical Exam    Airway  Mallampati: IV  TM Distance: 4 - 6 cm  Neck ROM: short neck   Mouth opening: Normal     Cardiovascular    Rhythm: regular  Rate: normal         Dental  No notable dental hx       Pulmonary  Breath sounds clear to auscultation               Abdominal  Abdominal exam normal       Other Findings            Anesthetic Plan    ASA: 3  Anesthesia type: spinal            Anesthetic plan and risks discussed with: Patient

## 2022-06-29 NOTE — H&P
History and Physical    Patient: Olesya Noe MRN: [id number]  SSN: [id number]    YOB: 1985  Age: 40 y.o. Sex: female      Subjective:      Olesya Noe is a 40 y.o. female  (  x 3, LTCS x 1, followed by 3 VBACs) at 44 weeks based on pt's LMP vs. 37.4 weeks based on US  3 weeks ago. Limited PNC due to transportation issues. Pt with hx of CHTN- on low dose of Labetalol but does not take everyday as prescribed. Denies any HA or visual changes, denies any LE edema. Seen earlier today at office- but instructed to come to L and D for evaluation. Had to arrange  prior to coming over. Notes good FM, denies any LOF, does note some UC's     Past Medical History:   Diagnosis Date    Diabetes ([facility] [address]     HTN in pregnancy, chronic 3/16/2021    Syphilis     treated in May    Trichimoniasis 2018    treated in this pregnancy     Past Surgical History:   Procedure Laterality Date    HX  SECTION        No family history on file. Social History     Tobacco Use    Smoking status: Former Smoker     Packs/day: 0.25     Years: 2.00     Pack years: 0.50    Smokeless tobacco: Never Used   Substance Use Topics    Alcohol use: No      Prior to Admission medications    Medication Sig Start Date End Date Taking? Authorizing Provider   labetaloL (NORMODYNE) 200 mg tablet Take 2 Tabs by mouth two (2) times a day. Indications: high blood pressure 3/19/21   Gilda Alaniz MD   oxyCODONE-acetaminophen (PERCOCET) 5-325 mg per tablet Take 1 Tab by mouth every four (4) hours as needed. Max Daily Amount: 6 Tabs. 18   Eual Severance, MD        No Known Allergies    Review of Systems:  A comprehensive review of systems was negative except for that written in the History of Present Illness.     Objective:     Vitals:    22   BP:  (!) 208/135 (!) 211/134    Pulse:  (!) 124 (!) 116    Resp:       Temp:       SpO2: 100% 97% 100% 100% Weight:       Height:            Physical Exam:  GENERAL: alert, cooperative, no distress, appears stated age  [de-identified]: Gravid, NST reactive, Irregular UC's  Bedside US: Vtx  Cervix: Long/Posterior and closed    Assessment:     Hospital Problems  Date Reviewed: 2022          Codes Class Noted POA    Pregnancy ICD-10-CM: Z34.90  ICD-9-CM: V22.2  2022 Unknown        Hypertension ICD-10-CM: I10  ICD-9-CM: 401.9  2022 Unknown        Advanced maternal age in multigravida, third trimester ICD-10-CM: O09.523  ICD-9-CM: 659.63  2022 Unknown        Late prenatal care affecting pregnancy in third trimester ICD-10-CM: O09.33  ICD-9-CM: V23.7  2022 Unknown        History of  ICD-10-CM: Z98.891  ICD-9-CM: V13.29  2022 Unknown        Chronic hypertension with superimposed preeclampsia ICD-10-CM: O11.9  ICD-9-CM: 642.70  2022 Unknown        Obesity affecting pregnancy in third trimester ICD-10-CM: O99.213  ICD-9-CM: 649.13  3/16/2021 Yes        HTN in pregnancy, chronic ICD-10-CM: O10.919  ICD-9-CM: 642.00  3/16/2021 Yes        History of  section ICD-10-CM: Z98.891  ICD-9-CM: V45.89  3/16/2021 Yes        History of group B Streptococcus (GBS) infection ICD-10-CM: Z86.19  ICD-9-CM: V12.09  3/16/2021 Yes              Plan:     Had long DWP-based on LMP is 39 weeks, based on recent US is at least 37 weeks  CHTN-poorly controlled- will give IV hydralazine first if no drop in BP will try Labetalol IV  Start magnesium for SIPIH  Once BP better controlled will augment with pitocin(can't use Cytotec), Risks benefits and alternatives DWP including possible uterine rupture with , DWP possible need for RCS, pt understands and agrees  Start Abxs for GBS( hx of GBS previously)  Questions addressed with the pt.     Signed By: Beena Avelar MD     2022

## 2022-06-29 NOTE — ANESTHESIA PROCEDURE NOTES
Spinal Block    Start time: 6/29/2022 1:33 AM  End time: 6/29/2022 1:37 AM  Performed by: Irena Amin CRNA  Authorized by: Chucky Miller MD     Pre-procedure:   Indications: primary anesthetic  Preanesthetic Checklist: patient identified, risks and benefits discussed, anesthesia consent, site marked, patient being monitored, timeout performed and fire risk safety assessment completed and verbalized    Timeout Time: 01:31 EDT          Spinal Block:   Patient Position:  Seated  Prep Region:  Lumbar  Prep: Betadine      Location:  L2-3  Technique:  Single shot  Local: bupivacaine 0.75% in dextrose 8.25% preserv-free (SENSORCAINE) Intrathecal, 13 mg  morphine (PF) (DURAMORPH;ASTRAMORPH) 0.5 mg/mL injection, 0.15 mg  Local Dose (mL):  1.7  Med Admin Time: 6/29/2022 1:35 AM    Needle:   Needle Type:  Pencan  Needle Gauge:  25 G  Attempts:  1      Events: CSF confirmed, no blood with aspiration and no paresthesia        Assessment:  Insertion:  Uncomplicated  Patient tolerance:  Patient tolerated the procedure well with no immediate complications

## 2022-06-29 NOTE — PROGRESS NOTES
1900: Bedside shift report received from Wildrose Bench Rn. Assume care of pt. Pt alert in semi fowlers position in bed with  in her arms. Magnesium sulfate infusion and LR in progress via access to left AC. Reports itching and rate pain at 6/10. Plan of care for shift discussed, pt verbalized understanding. 1933: Percocet given as per order for pain relief. 2100: Pt resting quietly in bed.       0100: Pt alert in bed attending to . 6126: Pt resting in bed  in arms, Magnesium sulfate infusion stopped.      0715: Report given to on coming shift

## 2022-06-29 NOTE — PROGRESS NOTES
Operative Report    Patient: Kike Evans MRN: [id number]  SSN: [id number]    YOB: 1985  Age: 40 y.o. Sex: female       Date of Surgery: 2022     Preoperative Diagnosis: Super Imposed Preeclampsia; None Reassureing Fetal Status , Uncontrolled HTN, obesity, AMA, h/o     Postoperative Diagnosis: Same as pre- op; MSL and Oligo     Surgeon(s) and Role:     Myra Rodriguez MD - Primary    Anesthesia: Spinal     Procedure: Procedure(s):   SECTION     Estimated Blood Loss:  118 cc    Complications: None    Drains:   Chung    Specimens:   ID Type Source Tests Collected by Time Destination   1 :  Cord blood   Nicolette Salas MD 2022 5405 Microbiology           Surgical Findings: Normal Uterus tubes and ovaries, Meconium stained fluid, little if any amniotic fluid,Thin JJ,LBI delivered in normal VTX fashion, 3V Cord, Nl Placenta, Abxs Given, Cord Blood drawn, Peds and Resp present at delivery    Counts: Sponge and needle counts were correct times two.     Signed By:  Meli Martinez MD     2022

## 2022-06-30 LAB
ALBUMIN SERPL-MCNC: 2 G/DL (ref 3.5–5)
ALBUMIN/GLOB SERPL: 0.5 {RATIO} (ref 1.1–2.2)
ALP SERPL-CCNC: 92 U/L (ref 45–117)
ALT SERPL-CCNC: 11 U/L (ref 12–78)
ANION GAP SERPL CALC-SCNC: 9 MMOL/L (ref 5–15)
AST SERPL W P-5'-P-CCNC: 13 U/L (ref 15–37)
BACTERIA SPEC CULT: NORMAL
BASOPHILS # BLD: 0 K/UL (ref 0–0.1)
BASOPHILS NFR BLD: 0 % (ref 0–1)
BILIRUB SERPL-MCNC: 0.3 MG/DL (ref 0.2–1)
BUN SERPL-MCNC: 20 MG/DL (ref 6–20)
BUN/CREAT SERPL: 10 (ref 12–20)
CA-I BLD-MCNC: 7.3 MG/DL (ref 8.5–10.1)
CHLORIDE SERPL-SCNC: 105 MMOL/L (ref 97–108)
CO2 SERPL-SCNC: 22 MMOL/L (ref 21–32)
CREAT SERPL-MCNC: 1.91 MG/DL (ref 0.55–1.02)
DIFFERENTIAL METHOD BLD: ABNORMAL
EOSINOPHIL # BLD: 0.1 K/UL (ref 0–0.4)
EOSINOPHIL NFR BLD: 1 % (ref 0–7)
ERYTHROCYTE [DISTWIDTH] IN BLOOD BY AUTOMATED COUNT: 16.4 % (ref 11.5–14.5)
GLOBULIN SER CALC-MCNC: 3.7 G/DL (ref 2–4)
GLUCOSE SERPL-MCNC: 112 MG/DL (ref 65–100)
HCT VFR BLD AUTO: 25.2 % (ref 35–47)
HGB BLD-MCNC: 8 G/DL (ref 11.5–16)
HIV1 RNA # SERPL NAA+PROBE: <20 COPIES/ML
HIV1 RNA SERPL NAA+PROBE-LOG#: NORMAL LOG10COPY/ML
IMM GRANULOCYTES # BLD AUTO: 0.1 K/UL (ref 0–0.04)
IMM GRANULOCYTES NFR BLD AUTO: 1 % (ref 0–0.5)
LYMPHOCYTES # BLD: 2.8 K/UL (ref 0.8–3.5)
LYMPHOCYTES NFR BLD: 24 % (ref 12–49)
MAGNESIUM SERPL-MCNC: 6.1 MG/DL (ref 1.6–2.4)
MCH RBC QN AUTO: 27.8 PG (ref 26–34)
MCHC RBC AUTO-ENTMCNC: 31.7 G/DL (ref 30–36.5)
MCV RBC AUTO: 87.5 FL (ref 80–99)
MONOCYTES # BLD: 1.3 K/UL (ref 0–1)
MONOCYTES NFR BLD: 11 % (ref 5–13)
NEUTS SEG # BLD: 7.8 K/UL (ref 1.8–8)
NEUTS SEG NFR BLD: 63 % (ref 32–75)
NRBC # BLD: 0 K/UL (ref 0–0.01)
NRBC BLD-RTO: 0 PER 100 WBC
PLATELET # BLD AUTO: 98 K/UL (ref 150–400)
PMV BLD AUTO: 13 FL (ref 8.9–12.9)
POTASSIUM SERPL-SCNC: 3.9 MMOL/L (ref 3.5–5.1)
PROT SERPL-MCNC: 5.7 G/DL (ref 6.4–8.2)
RBC # BLD AUTO: 2.88 M/UL (ref 3.8–5.2)
RUBV IGG SERPL IA-ACNC: 3.83 INDEX
RUBV IGM SER IA-ACNC: 22.1 AU/ML (ref 0–19.9)
SODIUM SERPL-SCNC: 136 MMOL/L (ref 136–145)
SPECIAL REQUESTS,SREQ: NORMAL
WBC # BLD AUTO: 12.1 K/UL (ref 3.6–11)

## 2022-06-30 PROCEDURE — 74011250637 HC RX REV CODE- 250/637: Performed by: OBSTETRICS & GYNECOLOGY

## 2022-06-30 PROCEDURE — 74011250636 HC RX REV CODE- 250/636: Performed by: OBSTETRICS & GYNECOLOGY

## 2022-06-30 PROCEDURE — 85025 COMPLETE CBC W/AUTO DIFF WBC: CPT

## 2022-06-30 PROCEDURE — 83735 ASSAY OF MAGNESIUM: CPT

## 2022-06-30 PROCEDURE — 75410000003 HC RECOV DEL/VAG/CSECN EA 0.5 HR

## 2022-06-30 PROCEDURE — 65410000002 HC RM PRIVATE OB

## 2022-06-30 PROCEDURE — 36415 COLL VENOUS BLD VENIPUNCTURE: CPT

## 2022-06-30 PROCEDURE — 80053 COMPREHEN METABOLIC PANEL: CPT

## 2022-06-30 RX ORDER — NIFEDIPINE 30 MG/1
30 TABLET, EXTENDED RELEASE ORAL
Status: COMPLETED | OUTPATIENT
Start: 2022-06-30 | End: 2022-06-30

## 2022-06-30 RX ORDER — IBUPROFEN 800 MG/1
800 TABLET ORAL EVERY 8 HOURS
Status: DISCONTINUED | OUTPATIENT
Start: 2022-06-30 | End: 2022-07-01 | Stop reason: HOSPADM

## 2022-06-30 RX ORDER — NIFEDIPINE 30 MG/1
30 TABLET, EXTENDED RELEASE ORAL DAILY
Status: DISCONTINUED | OUTPATIENT
Start: 2022-07-01 | End: 2022-07-01

## 2022-06-30 RX ADMIN — LABETALOL HYDROCHLORIDE 300 MG: 200 TABLET, FILM COATED ORAL at 21:31

## 2022-06-30 RX ADMIN — LABETALOL HYDROCHLORIDE 300 MG: 200 TABLET, FILM COATED ORAL at 15:00

## 2022-06-30 RX ADMIN — LABETALOL HYDROCHLORIDE 300 MG: 200 TABLET, FILM COATED ORAL at 07:23

## 2022-06-30 RX ADMIN — NIFEDIPINE 30 MG: 30 TABLET, FILM COATED, EXTENDED RELEASE ORAL at 21:39

## 2022-06-30 RX ADMIN — OXYCODONE AND ACETAMINOPHEN 2 TABLET: 5; 325 TABLET ORAL at 13:00

## 2022-06-30 RX ADMIN — OXYCODONE AND ACETAMINOPHEN 2 TABLET: 5; 325 TABLET ORAL at 20:14

## 2022-06-30 RX ADMIN — MAGNESIUM SULFATE HEPTAHYDRATE 2 G/HR: 40 INJECTION, SOLUTION INTRAVENOUS at 00:33

## 2022-06-30 RX ADMIN — IBUPROFEN 800 MG: 800 TABLET, FILM COATED ORAL at 13:01

## 2022-06-30 RX ADMIN — OXYCODONE AND ACETAMINOPHEN 2 TABLET: 5; 325 TABLET ORAL at 01:44

## 2022-06-30 RX ADMIN — IBUPROFEN 800 MG: 800 TABLET, FILM COATED ORAL at 21:31

## 2022-06-30 RX ADMIN — IBUPROFEN 800 MG: 800 TABLET, FILM COATED ORAL at 10:13

## 2022-06-30 RX ADMIN — OXYCODONE AND ACETAMINOPHEN 2 TABLET: 5; 325 TABLET ORAL at 07:23

## 2022-06-30 RX ADMIN — PENICILLIN G BENZATHINE 2.4 MILLION UNITS: 2400000 INJECTION, SUSPENSION INTRAMUSCULAR at 10:13

## 2022-06-30 NOTE — PROGRESS NOTES
Progress Note      2022 10:11 AM  NAME: Irby Dancer   MRN:  [id number]   Admit Diagnosis: Hypertension [I10]  Pregnancy [Z34.90]      Problem List:   Labile hypertension, now controlled  Preeclampsia  Tobacco use  Type 2 diabetes  Obese     Assessment/Plan:   Encourage smoking cessation  No active cardiac issues  Follow-up with primary care         []       High complexity decision making was performed in this patient at high risk for decompensation with multiple organ involvement. Subjective:     Irby Dancer denies chest pain, dyspnea. Discussed with attending. Review of Systems:   Negative except for as noted above. Objective:      Physical Exam:    Last 24hrs VS reviewed since prior progress note. Most recent are:    Visit Vitals  /72   Pulse 78   Temp 98.2 °F (36.8 °C)   Resp 16   Ht 5' 5\" (1.651 m)   Wt 140.2 kg (309 lb)   SpO2 99%   Breastfeeding Unknown   BMI 51.42 kg/m²       Intake/Output Summary (Last 24 hours) at 2022 1011  Last data filed at 2022 0732  Gross per 24 hour   Intake 350 ml   Output 1760 ml   Net -1410 ml        General Appearance: Alert; no acute distress. Ears/Nose/Mouth/Throat: moist mucous membranes  Neck: Supple. Chest: Lungs clear to auscultation bilaterally. Cardiovascular: Regular rate and rhythm, S1S2 normal  Abdomen: Soft, non-tender, bowel sounds are active. Extremities: No edema bilaterally. Skin: Warm and dry. []         Post-cath site without hematoma, bruit, tenderness, or thrill. Distal pulses intact. PMH/SH reviewed - no change compared to H&P    Telemetry: NSR    EK/28/22    ECHO ADULT COMPLETE 2022    Interpretation Summary    Left Ventricle: Normal left ventricular systolic function with a visually estimated EF of 50 - 55%. Left ventricle size is normal. Normal wall thickness. Normal wall motion. Normal diastolic function.   Mitral Valve: Mildly thickened leaflet.   Tricuspid Valve:  The estimated RVSP is 36 mmHg.   Pericardium: Small (<1 cm) localized pericardial effusion present around the left ventricle. No indication of cardiac tamponade. Signed by: Dani Jimenez MD on 6/29/2022  2:27 PM      []  No new EKG for review    Lab Data Personally Reviewed:    Recent Labs     06/30/22 0603 06/28/22 2058   WBC 12.1* 12.9*   HGB 8.0* 12.0   HCT 25.2* 35.5   PLT 98* 128*     No results for input(s): INR, PTP, APTT, INREXT in the last 72 hours. Recent Labs     06/28/22 2058      K 4.0      CO2 25   BUN 12   CREA 1.37*   *   CA 8.6     No results for input(s): CPK, CKNDX, TROIQ in the last 72 hours. No lab exists for component: CPKMB  No results found for: CHOL, CHOLX, CHLST, CHOLV, HDL, HDLP, LDL, LDLC, DLDLP, TGLX, TRIGL, TRIGP, CHHD, CHHDX    Recent Labs     06/28/22 2058   *   TP 7.0   ALB 2.5*   GLOB 4.5*     No results for input(s): PH, PCO2, PO2 in the last 72 hours.     Medications Personally Reviewed:    Current Facility-Administered Medications   Medication Dose Route Frequency    penicillin g benzathine (BICILLIN LA) IM injection 2.4 Million Units  2.4 Million Units IntraMUSCular ONCE    ibuprofen (MOTRIN) tablet 800 mg  800 mg Oral Q8H    sodium chloride (NS) flush 5-40 mL  5-40 mL IntraVENous Q8H    sodium chloride (NS) flush 5-40 mL  5-40 mL IntraVENous PRN    nalbuphine (NUBAIN) injection 5 mg  5 mg IntraVENous Q6H PRN    oxytocin (PITOCIN) 10 unit bolus from bag  10 Units IntraVENous PRN    simethicone (MYLICON) tablet 80 mg  80 mg Oral QID PRN    measles, mumps & rubella Vacc (PF) (M-M-R II) injection 0.5 mL  0.5 mL SubCUTAneous PRIOR TO DISCHARGE    zolpidem (AMBIEN) tablet 5 mg  5 mg Oral QHS PRN    oxyCODONE-acetaminophen (PERCOCET) 5-325 mg per tablet 1 Tablet  1 Tablet Oral Q4H PRN    oxyCODONE-acetaminophen (PERCOCET) 5-325 mg per tablet 2 Tablet  2 Tablet Oral Q4H PRN    lactated Ringers infusion  75 mL/hr IntraVENous CONTINUOUS    acetaminophen (TYLENOL) tablet 650 mg  650 mg Oral Q6H PRN    Or    acetaminophen (TYLENOL) suppository 650 mg  650 mg Rectal Q6H PRN    magnesium sulfate 40 g/1000 mL Sterile Water infusion  2 g/hr IntraVENous CONTINUOUS    calcium gluconate 1 gram in sodium chloride (ISO-OSM) 50 mL infusion  1 g IntraVENous PRN    ondansetron (ZOFRAN) injection 4 mg  4 mg IntraVENous Q6H PRN    labetaloL (NORMODYNE) tablet 300 mg  300 mg Oral Q8H         Charmaine Hernandez MD

## 2022-06-30 NOTE — PROGRESS NOTES
6977: Received care of Ms. Jeremy Royal resting in bed and awake. No acute distress noted. Shift Assessment initiated and completed. Temp 98.2 and rates abdominal pain at a \"7\". Saline lock remains intact in the left antecubital space and free of s/s of infection. 3M dressing remained covering the patient's pfannenstiel incision. Chung catheter remains intact and draining clear yellow colored urine in the drainage bag. 400ml of urine noted in the drainage bag. POC reviewed. 7887: Labetalol 300mg and Percocet 5/325mg, 2 tablets given po.    0732: Chung catheter removed and approximately 200ml of clear yellow colored urine noted in the drainage bag. Patient tolerated the procedure well. Pericare was performed. 0900: Dr. Jane Negron called to Labor and Delivery to inquire about the patient. The writer informed Dr. Jane Negron of the patient's blood pressures thus far and informed of the patient's RPR result being reactive 2015 and titer reactive. The following orders were received: Penicillin G  Benzathine 2.4mg IM x1 dose, CMP, Magnesium level now and  Motrin 800mg po every 8 hours. 0940: The writer placed a call to Alison Palma and informed her of the ordered Labs needed to be drawn. [employer] informed the writer that she would inform someone to obtain the blood-work. 1013: Scheduled Motrin 800mg given po as ordered. Penicillin G Benzathine given in right ventrogluteal muscle. Patient tolerated the injection well. 1017: Cardiologist in on rounds. 1022: Neonatologist in on rounds. 1040: Ms. Jeremy Royal was transported in her bed with the infant in her arms to Postpartum, room 308 and accompanied by the writer and the Student Nurse x2. No problems noted. No problems noted. Bedside report given to SHALINI Lyon and CHIKI Campos RN.

## 2022-06-30 NOTE — PROGRESS NOTES
Problem: Falls - Risk of  Goal: *Absence of Falls  Description: Document Brar Reason Fall Risk and appropriate interventions in the flowsheet.   Outcome: Progressing Towards Goal  Note: Fall Risk Interventions:            Medication Interventions: Patient to call before getting OOB                   Problem: Patient Education: Go to Patient Education Activity  Goal: Patient/Family Education  Outcome: Progressing Towards Goal     Problem: Patient Education: Go to Patient Education Activity  Goal: Patient/Family Education  Outcome: Progressing Towards Goal     Problem: Vaginal Delivery: Day of Deliver-Laboring  Goal: Off Pathway (Use only if patient is Off Pathway)  Outcome: Progressing Towards Goal  Goal: Activity/Safety  Outcome: Progressing Towards Goal  Goal: Consults, if ordered  Outcome: Progressing Towards Goal  Goal: Diagnostic Test/Procedures  Outcome: Progressing Towards Goal  Goal: Nutrition/Diet  Outcome: Progressing Towards Goal  Goal: Discharge Planning  Outcome: Progressing Towards Goal  Goal: Medications  Outcome: Progressing Towards Goal  Goal: Respiratory  Outcome: Progressing Towards Goal  Goal: Treatments/Interventions/Procedures  Outcome: Progressing Towards Goal  Goal: *Vital signs within defined limits  Outcome: Progressing Towards Goal  Goal: *Labs within defined limits  Outcome: Progressing Towards Goal  Goal: *Hemodynamically stable  Outcome: Progressing Towards Goal  Goal: *Optimal pain control at patient's stated goal  Outcome: Progressing Towards Goal     Problem: Vaginal Delivery: Day of Delivery-Post delivery  Goal: Off Pathway (Use only if patient is Off Pathway)  Outcome: Progressing Towards Goal  Goal: Activity/Safety  Outcome: Progressing Towards Goal  Goal: Consults, if ordered  Outcome: Progressing Towards Goal  Goal: Nutrition/Diet  Outcome: Progressing Towards Goal  Goal: Discharge Planning  Outcome: Progressing Towards Goal  Goal: Medications  Outcome: Progressing Towards Goal  Goal: Treatments/Interventions/Procedures  Outcome: Progressing Towards Goal  Goal: *Vital signs within defined limits  Outcome: Progressing Towards Goal  Goal: *Labs within defined limits  Outcome: Progressing Towards Goal  Goal: *Hemodynamically stable  Outcome: Progressing Towards Goal  Goal: *Optimal pain control at patient's stated goal  Outcome: Progressing Towards Goal  Goal: *Participates in infant care  Outcome: Progressing Towards Goal  Goal: *Demonstrates progressive activity  Outcome: Progressing Towards Goal  Goal: *Tolerating diet  Outcome: Progressing Towards Goal     Problem: Vaginal Delivery: Postpartum Day 1  Goal: Off Pathway (Use only if patient is Off Pathway)  Outcome: Progressing Towards Goal  Goal: Activity/Safety  Outcome: Progressing Towards Goal  Goal: Consults, if ordered  Outcome: Progressing Towards Goal  Goal: Diagnostic Test/Procedures  Outcome: Progressing Towards Goal  Goal: Nutrition/Diet  Outcome: Progressing Towards Goal  Goal: Discharge Planning  Outcome: Progressing Towards Goal  Goal: Medications  Outcome: Progressing Towards Goal  Goal: Treatments/Interventions/Procedures  Outcome: Progressing Towards Goal  Goal: Psychosocial  Outcome: Progressing Towards Goal  Goal: *Vital signs within defined limits  Outcome: Progressing Towards Goal  Goal: *Labs within defined limits  Outcome: Progressing Towards Goal  Goal: *Hemodynamically stable  Outcome: Progressing Towards Goal  Goal: *Optimal pain control at patient's stated goal  Outcome: Progressing Towards Goal  Goal: *Participates in infant care  Outcome: Progressing Towards Goal  Goal: *Demonstrates progressive activity  Outcome: Progressing Towards Goal  Goal: *Performs self perineal care  Outcome: Progressing Towards Goal  Goal: *Appropriate parent-infant bonding  Outcome: Progressing Towards Goal  Goal: *Tolerating diet  Outcome: Progressing Towards Goal  Goal: *Performs self breast care  Outcome: Progressing Towards Goal     Problem: Vaginal Delivery: Postpartum 2  Goal: Off Pathway (Use only if patient is Off Pathway)  Outcome: Progressing Towards Goal  Goal: Activity/Safety  Outcome: Progressing Towards Goal  Goal: Consults, if ordered  Outcome: Progressing Towards Goal  Goal: Nutrition/Diet  Outcome: Progressing Towards Goal  Goal: Discharge Planning  Outcome: Progressing Towards Goal  Goal: Medications  Outcome: Progressing Towards Goal  Goal: Treatments/Interventions/Procedures  Outcome: Progressing Towards Goal  Goal: Psychosocial  Outcome: Progressing Towards Goal     Problem: Vaginal Delivery: Discharge Outcomes  Goal: *Verbalizes name, dosage, time, side effects, and number of days to continue medications  Outcome: Progressing Towards Goal  Goal: *Describes available resources and support systems  Outcome: Progressing Towards Goal  Goal: *No signs and symptoms of infection  Outcome: Progressing Towards Goal  Goal: *Birth certificate information completed  Outcome: Progressing Towards Goal  Goal: *Received and verbalizes understanding of discharge plan and instructions  Outcome: Progressing Towards Goal  Goal: *Vital signs within defined limits  Outcome: Progressing Towards Goal  Goal: *Labs within defined limits  Outcome: Progressing Towards Goal  Goal: *Hemodynamically stable  Outcome: Progressing Towards Goal  Goal: *Optimal pain control at patient's stated goal  Outcome: Progressing Towards Goal  Goal: *Participates in infant care  Outcome: Progressing Towards Goal  Goal: *Demonstrates progressive activity  Outcome: Progressing Towards Goal  Goal: *Appropriate parent-infant bonding  Outcome: Progressing Towards Goal  Goal: *Tolerating diet  Outcome: Progressing Towards Goal

## 2022-06-30 NOTE — PROGRESS NOTES
Progress Note                               Patient: Matias Haines MRN: [id number]  SSN: [id number]    YOB: 1985  Age: 40 y.o. Sex: female      1 Day Post-Op     Subjective:     Patient doing well postpartum without significant complaints. Voiding without difficulty. Patient reports normal lochia. . Patient requesting to be discharge as her mother who is caring for her children is requesting her to come home. Discussed with patient she had severe preeclampsia and bp still needs to be monitored and controlled. Discharge not advised at this time as she can  Have increased risk of morbidity/ morality. Beth Dye ie  Risk of seizure activity, CVA .   Patient states comprehension    Objective:     Patient Vitals for the past 18 hrs:   Temp Pulse Resp BP SpO2   06/30/22 1700 98.4 °F (36.9 °C) 85 20 (!) 162/95 --   06/30/22 1100 97.4 °F (36.3 °C) 90 20 128/70 --   06/30/22 0724 -- -- -- -- 99 %   06/30/22 0723 -- 78 -- 119/72 --   06/30/22 0719 -- -- -- -- 98 %   06/30/22 0714 -- -- -- -- 100 %   06/30/22 0709 -- -- -- -- 99 %   06/30/22 0706 98.2 °F (36.8 °C) 78 16 119/72 99 %   06/30/22 0704 -- -- -- -- 99 %   06/30/22 0703 -- 93 -- (!) 99/43 --   06/30/22 0554 -- -- -- -- 99 %   06/30/22 0549 -- -- -- -- 99 %   06/30/22 0544 -- -- -- -- 98 %   06/30/22 0539 -- -- -- -- 99 %   06/30/22 0534 -- -- -- -- 98 %   06/30/22 0529 -- -- -- -- 98 %   06/30/22 0524 -- -- -- -- 99 %   06/30/22 0519 -- -- -- -- 98 %   06/30/22 0514 -- -- -- -- 98 %   06/30/22 0509 -- -- -- -- 98 %   06/30/22 0504 -- -- -- -- 98 %   06/30/22 0459 -- 76 -- 113/63 98 %   06/30/22 0454 -- -- -- -- 98 %   06/30/22 0449 -- -- -- -- 100 %   06/30/22 0444 -- -- -- -- 98 %   06/30/22 0439 -- -- -- -- 99 %   06/30/22 0434 -- -- -- -- 99 %   06/30/22 0429 -- -- -- -- 100 %   06/30/22 0424 -- -- -- -- 98 %   06/30/22 0419 -- -- -- -- 97 %   06/30/22 0414 -- -- -- -- 98 %   06/30/22 0409 -- -- -- -- 98 %   06/30/22 0404 -- -- -- -- 97 %   06/30/22 0359 -- 78 -- 112/ 97 %   22 0354 -- -- -- -- 97 %   22 0349 -- -- -- -- 98 %   22 0344 -- -- -- -- 96 %   22 0339 -- -- -- -- 98 %   22 0334 -- -- -- -- 100 %   22 0329 -- -- -- -- 94 %   22 0325 -- -- -- -- 99 %   22 0320 -- -- -- -- 100 %   225 -- -- -- -- 97 %   22 0310 -- -- -- -- 97 %   22 0305 -- -- -- -- 98 %   22 0300 -- 74 -- (!) 112/58 98 %   22 0255 -- -- -- -- 100 %   22 0250 -- -- -- -- 97 %   22 0245 -- -- -- -- 98 %   22 0240 -- -- -- -- 99 %   22 0235 -- -- -- -- 100 %   22 0230 -- -- -- -- 99 %   22 0225 -- -- -- -- 98 %   22 0220 -- -- -- -- 99 %   22 0215 -- -- -- -- 100 %   22 0210 -- -- -- -- 99 %   22 0205 -- -- -- -- 99 %   22 0200 -- -- 18 -- 97 %   22 0159 -- 85 -- (!) 141/80 --   22 0158 -- -- -- -- 98 %   22 0153 -- -- -- -- 98 %       Temp (24hrs), Av °F (36.7 °C), Min:97.4 °F (36.3 °C), Max:98.4 °F (36.9 °C)      Physical Exam:    General:   Patient without distress. Abdomen: Soft, fundus firm at level of umbilicus, nontender   Incision: Clean, dry, and intact without erythema   Lower Extremities: Negative for swelling, cords, or tenderness        Lab/Data Review: All lab results for the last 24 hours reviewed. Assessment and Plan:     Patient appears to be having an uncomplicated post- course. Continue routine postoperative care and maternal education.

## 2022-06-30 NOTE — PROGRESS NOTES
Pt received to room 308 via labor and delivery . Received report from [facility], Care assumed.  Side rails up, call bell in reach, no concerns at this time

## 2022-07-01 VITALS
HEART RATE: 92 BPM | HEIGHT: 65 IN | WEIGHT: 293 LBS | DIASTOLIC BLOOD PRESSURE: 114 MMHG | TEMPERATURE: 98.6 F | RESPIRATION RATE: 19 BRPM | SYSTOLIC BLOOD PRESSURE: 197 MMHG | OXYGEN SATURATION: 99 % | BODY MASS INDEX: 48.82 KG/M2

## 2022-07-01 PROBLEM — Z53.29 LEFT AGAINST MEDICAL ADVICE: Status: ACTIVE | Noted: 2022-07-01

## 2022-07-01 LAB
CHOLEST SERPL-MCNC: 200 MG/DL
EST. AVERAGE GLUCOSE BLD GHB EST-MCNC: 114 MG/DL
HBA1C MFR BLD: 5.6 % (ref 4–5.6)
HDLC SERPL-MCNC: 74 MG/DL
HDLC SERPL: 2.7 {RATIO} (ref 0–5)
LDLC SERPL CALC-MCNC: 80.6 MG/DL (ref 0–100)
LIPID PROFILE,FLP: ABNORMAL
TRIGL SERPL-MCNC: 227 MG/DL (ref ?–150)
VLDLC SERPL CALC-MCNC: 45.4 MG/DL

## 2022-07-01 PROCEDURE — 99024 POSTOP FOLLOW-UP VISIT: CPT | Performed by: OBSTETRICS & GYNECOLOGY

## 2022-07-01 PROCEDURE — 74011250637 HC RX REV CODE- 250/637: Performed by: OBSTETRICS & GYNECOLOGY

## 2022-07-01 PROCEDURE — 80061 LIPID PANEL: CPT

## 2022-07-01 PROCEDURE — 36415 COLL VENOUS BLD VENIPUNCTURE: CPT

## 2022-07-01 PROCEDURE — 83036 HEMOGLOBIN GLYCOSYLATED A1C: CPT

## 2022-07-01 PROCEDURE — 74011000250 HC RX REV CODE- 250: Performed by: ANESTHESIOLOGY

## 2022-07-01 PROCEDURE — 86762 RUBELLA ANTIBODY: CPT

## 2022-07-01 RX ORDER — NIFEDIPINE 30 MG/1
30 TABLET, EXTENDED RELEASE ORAL 2 TIMES DAILY
Status: DISCONTINUED | OUTPATIENT
Start: 2022-07-01 | End: 2022-07-01

## 2022-07-01 RX ORDER — NIFEDIPINE 30 MG/1
60 TABLET, EXTENDED RELEASE ORAL DAILY
Status: DISCONTINUED | OUTPATIENT
Start: 2022-07-01 | End: 2022-07-01 | Stop reason: HOSPADM

## 2022-07-01 RX ORDER — LISINOPRIL 10 MG/1
20 TABLET ORAL DAILY
Status: DISCONTINUED | OUTPATIENT
Start: 2022-07-01 | End: 2022-07-01 | Stop reason: HOSPADM

## 2022-07-01 RX ADMIN — LABETALOL HYDROCHLORIDE 300 MG: 200 TABLET, FILM COATED ORAL at 05:48

## 2022-07-01 RX ADMIN — ACETAMINOPHEN 650 MG: 325 TABLET ORAL at 11:02

## 2022-07-01 RX ADMIN — LISINOPRIL 20 MG: 10 TABLET ORAL at 13:57

## 2022-07-01 RX ADMIN — IBUPROFEN 800 MG: 800 TABLET, FILM COATED ORAL at 05:48

## 2022-07-01 RX ADMIN — NIFEDIPINE 30 MG: 30 TABLET, FILM COATED, EXTENDED RELEASE ORAL at 07:07

## 2022-07-01 RX ADMIN — IBUPROFEN 800 MG: 800 TABLET, FILM COATED ORAL at 13:57

## 2022-07-01 RX ADMIN — OXYCODONE AND ACETAMINOPHEN 2 TABLET: 5; 325 TABLET ORAL at 02:24

## 2022-07-01 RX ADMIN — NIFEDIPINE 60 MG: 30 TABLET, FILM COATED, EXTENDED RELEASE ORAL at 13:57

## 2022-07-01 RX ADMIN — SODIUM CHLORIDE, PRESERVATIVE FREE 10 ML: 5 INJECTION INTRAVENOUS at 05:50

## 2022-07-01 RX ADMIN — OXYCODONE AND ACETAMINOPHEN 2 TABLET: 5; 325 TABLET ORAL at 08:06

## 2022-07-01 NOTE — PROGRESS NOTES
OB PROGRESS NOTE    PROBLEM:  Status post  section, postoperative day #2  Chronic hypertension with superimposed preeclampsia  Uncontrolled hypertension    SUBJECTIVE: Patient states doing well, has no complaints. VITALS:  Visit Vitals  BP (!) 179/108 (BP 1 Location: Right upper arm)   Pulse 89   Temp 98.4 °F (36.9 °C)   Resp 20   Ht 5' 5\" (1.651 m)   Wt 140.2 kg (309 lb)   SpO2 99%   Breastfeeding Unknown   BMI 51.42 kg/m²     HEART: Regular rhythm, no murmur  LUNGS: Clear to auscultation at both fields  ABDOMEN: Soft and depressible, normal bowel sounds  PELVIC: Normal lochia    ASSESSMENT: 40years old patient s/p  section, postoperative day #2. Prenatal care was complicated by late prenatal care, obesity, advanced maternal age, history of  section, and chronic hypertension with superimposed preeclampsia with severe features requiring magnesium sulfate. Patient has uncontrolled hypertension despite labetalol 300 mg every 8 hours and Procardia 30 mg twice daily. She is actually asymptomatic, tolerating diet, ambulating, voiding spontaneously, with adequate urine output. Very difficult to control blood pressure, cardiology will be notified for follow-up. PLAN:  Notify cardiology for follow-up.

## 2022-07-01 NOTE — PROGRESS NOTES
Patient states she has to go home and she needs to talk to Dr Bhargav Silva. 12- Dr Bhargav Silva aware that patient insisting she must go home today. 5- spoke to Dr Arash Edwards (cardiology) of patient's wishes to go home (including AMA). He said 'she can go home on the BP meds she is on'. He stated that he cannot come to see her because he is not in the hospital.    1310- Dr Bhargav Silva at bedside. 1530- patient BP still elevated. See flow sheet. Patient states 'I need the paper to sign so I can leave'. Patient states 'my children are at home alone with my 16year old who has disabilities'. Dr Bhargav Silva notified that patient is requesting [again] to sign out AMA even after being explained the risks of stroke, seizures, or worse associated with uncontrolled high blood pressure. Patient still insists on leaving and states that her neighbor is on his/her way. [id number]- patient still waiting on ride home. Baby also getting blood drawn. Patient dressed and packed up. Neighbor arrived with carseat to take her and baby home. AMA form signed. Provided patient with print outs regarding pre-eclampsia and the warning signs. Advised patient to call 911 if experiencing any of the pre eclamptic warning signs. Educated patient on the signs/symptoms of stroke. Advised patient to follow up with her PCP and a cardiologist ASAP (per Dr Bhargav Silva) to address uncontrolled high blood pressure. 1755- patient ready to go. Patient discharged via wheelchair to front lobby. Baby in car seat. Also, had previously provided patient with print out on stroke warning signs which she left behind in her room.

## 2022-07-01 NOTE — OP NOTES
[address] [place] [address]  OPERATIVE REPORT    Name:  Amarilys Lambert  MR#:  [id number]  :  1985  ACCOUNT #:  [de-identified]  DATE OF SERVICE:  2022    PREOPERATIVE DIAGNOSES:  1. Superimposed preeclampsia. 2.  Nonreassuring fetal tracing. 3.  Uncontrolled hypertension. 4.  Obesity in pregnancy. 5.  Advanced maternal age. 6.  History of low-transverse  section. 7.  Late prenatal care in third trimester. POSTOPERATIVE DIAGNOSES:  1. Superimposed preeclampsia. 2.  Nonreassuring fetal tracing. 3.  Uncontrolled hypertension. 4.  Obesity in pregnancy. 5.  Advanced maternal age. 6.  History of low-transverse  section. 7.  Late prenatal care in third trimester. 8.  Meconium-stained amniotic fluid. 9.  Oligohydramnios, severe. PROCEDURE PERFORMED:  Repeat low-transverse  section as noted above. SURGEON:  Jennifer Joy MD    ASSISTANT:  Bhupinder Carias    ANESTHESIA:  Spinal.    COMPLICATIONS:  None. DRAINS:  Chung catheter, clear urine. SPECIMENS REMOVED:  None    IMPLANTS:  None    ESTIMATED BLOOD LOSS:  400 mL. SURGICAL FINDINGS:  Normal uterus, tubes, and ovaries. Thick meconium-stained fluid, very little if any amniotic fluid. Thin lower uterine segment. Liveborn infant delivered normal vertex fashion. Three-vessel cord. Normal-appearing placenta. Otherwise, antibiotics given. Cord blood drawn. Peds and Respiratory present at the time of delivery. PROCEDURE:  The patient was taken to the operating room after informed consent had been reviewed. The spinal anesthesia was administered. She was placed in the supine position in a left lateral tilt. She was prepped and draped in normal sterile fashion. Pfannenstiel skin incision was made through her previous incision, taking down the underlying layer of fascia. Fascia was nicked in the midline. The fascial incision was then extended laterally using the curved Minaya scissors.   Superior margin of the fascia was grasped with Kocher clamps x2 and the rectus muscles were dissected off both sharply and bluntly. Attention was turned to the inferior margin where it was done in a similar fashion. Rectus muscles were  in midline. Peritoneum was identified, tented up, and entered sharply and extended superiorly and inferiorly under direct vision. Bladder blade was placed. The lower uterine segment was noted to be thin. A bladder flap was not created due to the thin lower uterine segment and the fact that the bladder was much lower than this area. The uterus was then scored in a horizontal fashion using the scalpel. It was nicked in the midline. The uterine incision was then extended laterally with blunt dissection. Thick meconium was noted upon entering and very little if any amniotic fluid other than the meconium that was noted. The infant's head was brought to the uterine incision, and while applying fundal pressure and guiding the infant's head, the infant was delivered. The infant was suctioned. Cord was clamped x2 and cut. The infant was handed off to the awaiting staff. Cord blood was drawn. Placenta was delivered. The uterus was then cleared off any remaining blood clot and tissue. After this was done, the bladder blade was replaced. Uterine incision was then grasped with [place] clamps. Uterine incision was reapproximated using a 0 Vicryl in a running lock fashion. Several 0 Vicryl imbricating stitches were placed to ensure good hemostasis. After this was done, all areas were inspected and noted to be hemostatic. Any remaining blood clot, laps, and instruments were removed. Prior to doing this, a piece of Interceed was placed over the incision site to prevent any future adhesions, hopefully. After this was done, the anterior peritoneum was reapproximated using a 2-0 Vicryl in a running fashion.   Fascia was reapproximated using a 0 Vicryl in a running fashion. Subcuticular fat was inspected. Hemostasis was achieved with the Bovie. Skin was reapproximated using a 4-0 Monocryl in a subcuticular fashion. Dressings were placed. Sponge, lap, and needle counts were correct x2. The patient was taken to the recovery room in a stable condition.         MD JASWANT [id number]  D:  06/30/2022 12:49  T:  06/30/2022 21:25  JOB #:  [id number]

## 2022-07-01 NOTE — PROGRESS NOTES
Dr Bekah Alberts notified of b/p 190/112,187/115, 186/114 , new orders taking. 0640 Pt  B/P 190/114 , 183/112 called to Dr Bekah Alberts , new orders taking. . Denies h/a , blurred vision, or epigastric pain. 0883 [facility]

## 2022-07-01 NOTE — ANESTHESIA POSTPROCEDURE EVALUATION
* No procedures listed *.    spinal    Anesthesia Post Evaluation        Anesthetic complications: no  Comments: Patient already discharged. INITIAL Post-op Vital signs: No vitals data found for the desired time range.

## 2022-07-01 NOTE — PROGRESS NOTES
Progress Note      7/1/2022 10:21 AM  NAME: Olesya Noe   MRN:  [id number]   Admit Diagnosis: Hypertension [I10]  Pregnancy [Z34.90]          Assessment/Plan:   Hypertension, better controlled. Echo with low normal LV function, left ventricular hypertrophy    Tobacco use, patient has not used tobacco during her pregnancy and encouraged patient to stay off. Type 2 diabetes, hemoglobin A1c of 6.0( 2018), recheck. Kidney disease, echo with LVH, need for tighter blood pressure control discussed with patient. []       High complexity decision making was performed in this patient at high risk for decompensation with multiple organ involvement. Subjective:     Olesya Noe denies chest pain, dyspnea. Discussed with RN events overnight. Review of Systems:    Symptom Y/N Comments  Symptom Y/N Comments   Fever/Chills N   Chest Pain N    Poor Appetite N   Edema N    Cough N   Abdominal Pain N    Sputum N   Joint Pain N    SOB/RAMIREZ N   Pruritis/Rash N    Nausea/vomit N   Tolerating PT/OT Y    Diarrhea N   Tolerating Diet Y    Constipation N   Other       Could NOT obtain due to:      Objective:      Physical Exam:    Last 24hrs VS reviewed since prior progress note. Most recent are:    Visit Vitals  BP (!) 160/94 (BP 1 Location: Right lower arm)   Pulse 90   Temp 98.4 °F (36.9 °C)   Resp 20   Ht 5' 5\" (1.651 m)   Wt 140.2 kg (309 lb)   SpO2 99%   Breastfeeding Unknown   BMI 51.42 kg/m²       Intake/Output Summary (Last 24 hours) at 7/1/2022 1021  Last data filed at 6/30/2022 1700  Gross per 24 hour   Intake --   Output 1000 ml   Net -1000 ml        General Appearance: Overweight female, alert; no acute distress. Ears/Nose/Mouth/Throat: Hearing grossly normal; moist mucous membranes  Neck: Supple. Chest: Lungs clear to auscultation bilaterally. Cardiovascular: Regular rate and rhythm, S1S2 normal, no murmur. Abdomen: Soft, non-tender, bowel sounds are active.   Extremities: No edema bilaterally. Skin: Warm and dry. []         Post-cath site without hematoma, bruit, tenderness, or thrill. Distal pulses intact. PMH/ reviewed - no change compared to H&P    Data Review    Telemetry:     EKG:   []  No new EKG for review    Lab Data Personally Reviewed:    Recent Labs     06/30/22 0603 06/28/22 2058   WBC 12.1* 12.9*   HGB 8.0* 12.0   HCT 25.2* 35.5   PLT 98* 128*     No results for input(s): INR, PTP, APTT, INREXT in the last 72 hours. Recent Labs     06/30/22  1111 06/28/22 2058    139   K 3.9 4.0    108   CO2 22 25   BUN 20 12   CREA 1.91* 1.37*   * 104*   CA 7.3* 8.6   MG 6.1*  --      No results for input(s): CPK, CKNDX, TROIQ in the last 72 hours. No lab exists for component: CPKMB  No results found for: CHOL, CHOLX, CHLST, CHOLV, HDL, HDLP, LDL, LDLC, DLDLP, Vesna Nipper, CHHD, CHHDX    Recent Labs     06/30/22  1111 06/28/22 2058   AP 92 137*   TP 5.7* 7.0   ALB 2.0* 2.5*   GLOB 3.7 4.5*     No results for input(s): PH, PCO2, PO2 in the last 72 hours.     Medications Personally Reviewed:    Current Facility-Administered Medications   Medication Dose Route Frequency    NIFEdipine ER (PROCARDIA XL) tablet 30 mg  30 mg Oral BID    ibuprofen (MOTRIN) tablet 800 mg  800 mg Oral Q8H    sodium chloride (NS) flush 5-40 mL  5-40 mL IntraVENous Q8H    sodium chloride (NS) flush 5-40 mL  5-40 mL IntraVENous PRN    nalbuphine (NUBAIN) injection 5 mg  5 mg IntraVENous Q6H PRN    oxytocin (PITOCIN) 10 unit bolus from bag  10 Units IntraVENous PRN    simethicone (MYLICON) tablet 80 mg  80 mg Oral QID PRN    measles, mumps & rubella Vacc (PF) (M-M-R II) injection 0.5 mL  0.5 mL SubCUTAneous PRIOR TO DISCHARGE    zolpidem (AMBIEN) tablet 5 mg  5 mg Oral QHS PRN    oxyCODONE-acetaminophen (PERCOCET) 5-325 mg per tablet 1 Tablet  1 Tablet Oral Q4H PRN    oxyCODONE-acetaminophen (PERCOCET) 5-325 mg per tablet 2 Tablet  2 Tablet Oral Q4H PRN    lactated Ringers infusion  75 mL/hr IntraVENous CONTINUOUS    acetaminophen (TYLENOL) tablet 650 mg  650 mg Oral Q6H PRN    Or    acetaminophen (TYLENOL) suppository 650 mg  650 mg Rectal Q6H PRN    calcium gluconate 1 gram in sodium chloride (ISO-OSM) 50 mL infusion  1 g IntraVENous PRN    ondansetron (ZOFRAN) injection 4 mg  4 mg IntraVENous Q6H PRN    labetaloL (NORMODYNE) tablet 300 mg  300 mg Oral Q8H         Vy Monte MD

## 2022-07-01 NOTE — PROGRESS NOTES
Patient with equivocal Rubella IgM, spoke with Issa Hurley,  at [facility]. Health Department requesting a re-draw for IgM and IgG on patient (mom), and an IgM and PCR for rubella on baby, and place baby on contact precaution.   Notified primary nurse Ernie Gregg of health departments request.

## 2022-07-02 LAB
RUBV IGG SERPL IA-ACNC: 2.75 INDEX
RUBV IGM SER IA-ACNC: <20 AU/ML (ref 0–19.9)

## 2022-07-06 LAB — T PALLIDUM AB SER QL IA: REACTIVE

## 2022-07-08 NOTE — DISCHARGE SUMMARY
DISCHARGE SUMMARY    ADMITTING DIAGNOSIS:  Pregnancy at 37 weeks  Superimposed preeclampsia  Nonreassuring fetal tracing  Uncontrolled hypertension  Obesity in pregnancy  Advanced maternal age  History of low-transverse  section  Late prenatal care in third trimester    DISCHARGE DIAGNOSIS:  Pregnancy at 37 weeks delivered  Superimposed preeclampsia  Nonreassuring fetal tracing  Uncontrolled hypertension  Obesity in pregnancy  Advanced maternal age  History of low-transverse  section  Late prenatal care in third trimester    PROCEDURES PERFORMED:Repeat low-transverse  section    HISTORY OF PRESENT ILLNESS: 40years old patient admitted with pregnancy at 37-5/7-week gestation who underwent a repeat  section. Pregnancy and postpartum complicated by uncontrolled hypertension. OBJECTIVE:  VITALS:  Visit Vitals  BP (!) 197/114 (BP 1 Location: Right upper arm, BP Patient Position: Semi fowlers)   Pulse 92   Temp 98.6 °F (37 °C)   Resp 19   Ht 5' 5\" (1.651 m)   Wt 309 lb (140.2 kg)   SpO2 99%   Breastfeeding Unknown   BMI 51.42 kg/m²     HEART: Regular rhythm, no murmur  LUNGS: Clear to auscultation at both fields  ABDOMEN: Soft and depressible, normal bowel sounds  PELVIC: Normal findings    LABORATORY:  Hemoglobin 8.0    HOSPITAL COURSE: Uncontrolled hypertension with blood pressure readings in the severe range. DISCHARGE MEDICATIONS: See med rec    DIET: Regular. Advance as tolerated. ACTIVITY: Pelvic rest for 6 weeks. DISCHARGE INSTRUCTIONS: PATIENT LEFT AGAINST MEDICAL ADVICE. FOLLOW-UP: Appointment to clinic.

## 2023-03-07 ENCOUNTER — DOCUMENTATION ONLY (OUTPATIENT)
Dept: OBGYN CLINIC | Age: 38
End: 2023-03-07

## 2023-11-05 ENCOUNTER — HOSPITAL ENCOUNTER (EMERGENCY)
Facility: HOSPITAL | Age: 38
Discharge: HOME | End: 2023-11-05
Payer: COMMERCIAL

## 2023-11-05 VITALS
WEIGHT: 219 LBS | HEART RATE: 85 BPM | RESPIRATION RATE: 18 BRPM | SYSTOLIC BLOOD PRESSURE: 188 MMHG | TEMPERATURE: 98.8 F | BODY MASS INDEX: 37.39 KG/M2 | HEIGHT: 64 IN | OXYGEN SATURATION: 100 % | DIASTOLIC BLOOD PRESSURE: 101 MMHG

## 2023-11-05 DIAGNOSIS — N30.00 ACUTE CYSTITIS WITHOUT HEMATURIA: Primary | ICD-10-CM

## 2023-11-05 LAB
APPEARANCE UR: CLEAR
BACTERIA URNS QL MICRO: ABNORMAL /HPF
BILIRUB UR QL: NEGATIVE
COLOR UR: YELLOW
EPITH CASTS URNS QL MICRO: ABNORMAL /LPF
GLUCOSE UR STRIP.AUTO-MCNC: NEGATIVE MG/DL
HGB UR QL STRIP: NEGATIVE
KETONES UR QL STRIP.AUTO: NEGATIVE MG/DL
LEUKOCYTE ESTERASE UR QL STRIP.AUTO: ABNORMAL
NITRITE UR QL STRIP.AUTO: NEGATIVE
PH UR STRIP: 7 (ref 5–8)
PROT UR STRIP-MCNC: ABNORMAL MG/DL
RBC #/AREA URNS HPF: ABNORMAL /HPF (ref 0–5)
SP GR UR REFRACTOMETRY: 1.01 (ref 1–1.03)
URINE CULTURE IF INDICATED: ABNORMAL
UROBILINOGEN UR QL STRIP.AUTO: 0.1 EU/DL (ref 0.2–1)
WBC URNS QL MICRO: ABNORMAL /HPF (ref 0–4)

## 2023-11-05 PROCEDURE — 81001 URINALYSIS AUTO W/SCOPE: CPT

## 2023-11-05 PROCEDURE — 99283 EMERGENCY DEPT VISIT LOW MDM: CPT

## 2023-11-05 RX ORDER — CEPHALEXIN 500 MG/1
500 CAPSULE ORAL 2 TIMES DAILY
Qty: 14 CAPSULE | Refills: 0 | Status: SHIPPED | OUTPATIENT
Start: 2023-11-05 | End: 2023-11-12

## 2023-11-05 ASSESSMENT — PAIN SCALES - GENERAL: PAINLEVEL_OUTOF10: 6

## 2023-11-05 ASSESSMENT — PAIN DESCRIPTION - LOCATION: LOCATION: ABDOMEN

## 2023-11-05 ASSESSMENT — PAIN DESCRIPTION - PAIN TYPE: TYPE: ACUTE PAIN

## 2023-11-05 ASSESSMENT — PAIN - FUNCTIONAL ASSESSMENT: PAIN_FUNCTIONAL_ASSESSMENT: 0-10

## 2023-11-05 NOTE — ED PROVIDER NOTES
[facility] EMERGENCY DEPT  EMERGENCY DEPARTMENT HISTORY AND PHYSICAL EXAM      Date: 2023  Patient Name: Maria Fernanda Ferguson  MRN: [id number]  YOB: 1985  Date of evaluation: 2023  Provider: GARDENIA Russell NP   Note Started: 3:58 PM EST 23    HISTORY OF PRESENT ILLNESS     Chief Complaint   Patient presents with    Abdominal Pain       History Provided By: Patient    HPI: Maria Fernanda Ferguson is a 41 y.o. female with past medical history as listed below, presents ambulatory to emergency department with complaints of dysuria and lower back pain for several days. Denies fever/chills, nausea/vomiting, hematuria, urinary frequency, diarrhea, abnormal vaginal bleeding/discharge and is otherwise in her usual state of health. Upon arrival to the ED pt is alert and oriented x 3, well-appearing, and interacting appropriately; no obvious distress noted. PAST MEDICAL HISTORY   Past Medical History:  Past Medical History:   Diagnosis Date    Diabetes ([address])     HTN in pregnancy, chronic 3/16/2021    Syphilis     treated in May    Trichimoniasis 2018    treated in this pregnancy       Past Surgical History:  Past Surgical History:   Procedure Laterality Date     SECTION         Family History:  No family history on file. Social History:  Social History     Tobacco Use    Smoking status: Former     Packs/day: .25     Types: Cigarettes    Smokeless tobacco: Never   Substance Use Topics    Alcohol use: No    Drug use: No       Allergies:  No Known Allergies    PCP: Mushtaq Castroable, DO    Current Meds:   No current facility-administered medications for this encounter.      Current Outpatient Medications   Medication Sig Dispense Refill    cephALEXin (KEFLEX) 500 MG capsule Take 1 capsule by mouth 2 times daily for 7 days 14 capsule 0    labetalol (NORMODYNE) 200 MG tablet Take 400 mg by mouth 2 times daily      oxyCODONE-acetaminophen (PERCOCET) 5-325 MG per tablet Take 1 tablet by mouth every 4

## 2023-11-05 NOTE — DISCHARGE INSTRUCTIONS
Thank you! Thank you for allowing me to care for you in the emergency department. It is my goal to provide you with excellent care. If you have not received excellent quality care, please ask to speak to the nurse manager. Please fill out the survey that will come to you by mail or email since we listen to your feedback! Below you will find a list of your tests from today's visit. Should you have any questions, please do not hesitate to call the emergency department. Labs  Recent Results (from the past 12 hour(s))   Urinalysis with Reflex to Culture    Collection Time: 11/05/23  3:24 PM    Specimen: Urine   Result Value Ref Range    Color, UA Yellow      Appearance Clear Clear      Specific Gravity, UA 1.010 1.003 - 1.030      pH, Urine 7.0 5.0 - 8.0      Protein, UA Trace (A) Negative mg/dL    Glucose, UA Negative Negative mg/dL    Ketones, Urine Negative Negative mg/dL    Bilirubin Urine Negative Negative      Blood, Urine Negative Negative      Urobilinogen, Urine 0.1 (L) 0.2 - 1.0 EU/dL    Nitrite, Urine Negative Negative      Leukocyte Esterase, Urine Trace (A) Negative      WBC, UA 5-10 0 - 4 /hpf    RBC, UA 0-5 0 - 5 /hpf    Epithelial Cells UA Few Few /lpf    BACTERIA, URINE 3+ (A) Negative /hpf    Urine Culture if Indicated Culture not indicated by UA result Culture not indicated by UA result         Radiologic Studies  No orders to display     ------------------------------------------------------------------------------------------------------------  The exam and treatment you received in the Emergency Department were for an urgent problem and are not intended as complete care. It is important that you follow-up with a doctor, nurse practitioner, or physician assistant to:  (1) confirm your diagnosis,  (2) re-evaluation of changes in your illness and treatment, and  (3) for ongoing care. Please take your discharge instructions with you when you go to your follow-up appointment.      If you

## 2024-04-25 RX ORDER — AMLODIPINE BESYLATE 10 MG/1
TABLET ORAL
COMMUNITY
Start: 2024-04-21

## 2024-04-25 RX ORDER — CLONIDINE HYDROCHLORIDE 0.1 MG/1
TABLET ORAL
COMMUNITY
Start: 2024-04-21

## 2024-05-17 ENCOUNTER — OFFICE VISIT (OUTPATIENT)
Facility: CLINIC | Age: 39
End: 2024-05-17
Payer: MEDICAID

## 2024-05-17 VITALS
HEART RATE: 86 BPM | RESPIRATION RATE: 18 BRPM | OXYGEN SATURATION: 100 % | DIASTOLIC BLOOD PRESSURE: 115 MMHG | WEIGHT: 253 LBS | HEIGHT: 64 IN | SYSTOLIC BLOOD PRESSURE: 176 MMHG | TEMPERATURE: 97.7 F | BODY MASS INDEX: 43.19 KG/M2

## 2024-05-17 DIAGNOSIS — I10 PRIMARY HYPERTENSION: Primary | ICD-10-CM

## 2024-05-17 DIAGNOSIS — Z13.1 DIABETES MELLITUS SCREENING: ICD-10-CM

## 2024-05-17 DIAGNOSIS — Z13.220 LIPID SCREENING: ICD-10-CM

## 2024-05-17 DIAGNOSIS — L68.9 EXCESSIVE HAIR GROWTH: ICD-10-CM

## 2024-05-17 DIAGNOSIS — I10 PRIMARY HYPERTENSION: ICD-10-CM

## 2024-05-17 PROBLEM — Z34.90 PREGNANCY: Status: RESOLVED | Noted: 2022-06-28 | Resolved: 2024-05-17

## 2024-05-17 PROBLEM — Z3A.37 37 WEEKS GESTATION OF PREGNANCY: Status: RESOLVED | Noted: 2021-03-16 | Resolved: 2024-05-17

## 2024-05-17 PROBLEM — O99.213 OBESITY AFFECTING PREGNANCY IN THIRD TRIMESTER: Status: RESOLVED | Noted: 2021-03-16 | Resolved: 2024-05-17

## 2024-05-17 PROBLEM — O09.33 PRENATAL CARE INSUFFICIENT, THIRD TRIMESTER: Status: RESOLVED | Noted: 2021-03-16 | Resolved: 2024-05-17

## 2024-05-17 PROBLEM — O09.33 LATE PRENATAL CARE AFFECTING PREGNANCY IN THIRD TRIMESTER: Status: RESOLVED | Noted: 2022-06-28 | Resolved: 2024-05-17

## 2024-05-17 PROBLEM — Z98.891 HISTORY OF VBAC: Status: RESOLVED | Noted: 2022-06-28 | Resolved: 2024-05-17

## 2024-05-17 PROBLEM — Z64.1 MULTIPAROUS: Status: RESOLVED | Noted: 2021-03-16 | Resolved: 2024-05-17

## 2024-05-17 PROBLEM — O11.9 CHRONIC HYPERTENSION WITH SUPERIMPOSED PREECLAMPSIA: Status: RESOLVED | Noted: 2022-06-28 | Resolved: 2024-05-17

## 2024-05-17 PROBLEM — O09.523 ADVANCED MATERNAL AGE IN MULTIGRAVIDA, THIRD TRIMESTER: Status: RESOLVED | Noted: 2022-06-28 | Resolved: 2024-05-17

## 2024-05-17 PROBLEM — O34.219 VBAC, DELIVERED, CURRENT HOSPITALIZATION: Status: RESOLVED | Noted: 2021-03-16 | Resolved: 2024-05-17

## 2024-05-17 PROBLEM — O41.03X0 OLIGOHYDRAMNIOS IN THIRD TRIMESTER: Status: RESOLVED | Noted: 2022-06-29 | Resolved: 2024-05-17

## 2024-05-17 PROBLEM — O10.919 HTN IN PREGNANCY, CHRONIC: Status: RESOLVED | Noted: 2021-03-16 | Resolved: 2024-05-17

## 2024-05-17 PROBLEM — Z53.29 LEFT AGAINST MEDICAL ADVICE: Status: RESOLVED | Noted: 2022-07-01 | Resolved: 2024-05-17

## 2024-05-17 PROCEDURE — 99204 OFFICE O/P NEW MOD 45 MIN: CPT | Performed by: STUDENT IN AN ORGANIZED HEALTH CARE EDUCATION/TRAINING PROGRAM

## 2024-05-17 PROCEDURE — 3077F SYST BP >= 140 MM HG: CPT | Performed by: STUDENT IN AN ORGANIZED HEALTH CARE EDUCATION/TRAINING PROGRAM

## 2024-05-17 PROCEDURE — 3080F DIAST BP >= 90 MM HG: CPT | Performed by: STUDENT IN AN ORGANIZED HEALTH CARE EDUCATION/TRAINING PROGRAM

## 2024-05-17 RX ORDER — CHLORTHALIDONE 25 MG/1
25 TABLET ORAL DAILY
Qty: 30 TABLET | Refills: 3 | Status: SHIPPED | OUTPATIENT
Start: 2024-05-17

## 2024-05-17 SDOH — ECONOMIC STABILITY: FOOD INSECURITY: WITHIN THE PAST 12 MONTHS, YOU WORRIED THAT YOUR FOOD WOULD RUN OUT BEFORE YOU GOT MONEY TO BUY MORE.: NEVER TRUE

## 2024-05-17 SDOH — ECONOMIC STABILITY: HOUSING INSECURITY
IN THE LAST 12 MONTHS, WAS THERE A TIME WHEN YOU DID NOT HAVE A STEADY PLACE TO SLEEP OR SLEPT IN A SHELTER (INCLUDING NOW)?: NO

## 2024-05-17 SDOH — ECONOMIC STABILITY: FOOD INSECURITY: WITHIN THE PAST 12 MONTHS, THE FOOD YOU BOUGHT JUST DIDN'T LAST AND YOU DIDN'T HAVE MONEY TO GET MORE.: NEVER TRUE

## 2024-05-17 SDOH — ECONOMIC STABILITY: INCOME INSECURITY: HOW HARD IS IT FOR YOU TO PAY FOR THE VERY BASICS LIKE FOOD, HOUSING, MEDICAL CARE, AND HEATING?: NOT HARD AT ALL

## 2024-05-17 ASSESSMENT — PATIENT HEALTH QUESTIONNAIRE - PHQ9
SUM OF ALL RESPONSES TO PHQ9 QUESTIONS 1 & 2: 2
SUM OF ALL RESPONSES TO PHQ QUESTIONS 1-9: 2
1. LITTLE INTEREST OR PLEASURE IN DOING THINGS: SEVERAL DAYS
SUM OF ALL RESPONSES TO PHQ QUESTIONS 1-9: 2
2. FEELING DOWN, DEPRESSED OR HOPELESS: SEVERAL DAYS
SUM OF ALL RESPONSES TO PHQ QUESTIONS 1-9: 2
SUM OF ALL RESPONSES TO PHQ QUESTIONS 1-9: 2

## 2024-05-17 ASSESSMENT — ANXIETY QUESTIONNAIRES
3. WORRYING TOO MUCH ABOUT DIFFERENT THINGS: NOT AT ALL
5. BEING SO RESTLESS THAT IT IS HARD TO SIT STILL: NOT AT ALL
IF YOU CHECKED OFF ANY PROBLEMS ON THIS QUESTIONNAIRE, HOW DIFFICULT HAVE THESE PROBLEMS MADE IT FOR YOU TO DO YOUR WORK, TAKE CARE OF THINGS AT HOME, OR GET ALONG WITH OTHER PEOPLE: NOT DIFFICULT AT ALL
2. NOT BEING ABLE TO STOP OR CONTROL WORRYING: NOT AT ALL
6. BECOMING EASILY ANNOYED OR IRRITABLE: NOT AT ALL
7. FEELING AFRAID AS IF SOMETHING AWFUL MIGHT HAPPEN: NOT AT ALL
4. TROUBLE RELAXING: NOT AT ALL
1. FEELING NERVOUS, ANXIOUS, OR ON EDGE: NOT AT ALL

## 2024-05-17 NOTE — PROGRESS NOTES
\"Have you been to the ER, urgent care clinic since your last visit?  Hospitalized since your last visit?\"    Yes, [facility]    “Have you seen or consulted any other health care providers outside of [facility] since your last visit?”    NO    Chief Complaint   Patient presents with    Establish Care     BP (!) 176/115 (Site: Right Upper Arm, Position: Sitting, Cuff Size: Large Adult) Comment: No BP meds today  Pulse 86   Temp 97.7 °F (36.5 °C) (Temporal)   Resp 18   Ht 1.626 m (5' 4\")   Wt 114.8 kg (253 lb)   SpO2 100%   BMI 43.43 kg/m²

## 2024-05-17 NOTE — PROGRESS NOTES
[facility]  Subjective       Chief Complaint   Patient presents with    Establish Care     HPI:  Nichelle Fitzpatrick is a 38 y.o. female.    New to provider EOC    Has not seen a PCP in over 3 years    HYPERTENSION, Essential, asymptomatic     Diagnosed: 5 years ago  Had to have emergency c section diagnosed with  Went to ED two months ago and started on clonidine and amlodopine   Reports Compliance with meds:  RAN OUT OF Amlodopine 3 days ago  Takes clonidine only once daily but DID NOT take yet today  Previously was on labetalol when pregnant    Checking BP at Home: no    She has 8 babies. Youngest 2 years old    SEXUAL HISTORY:  She has no plans to get pregnant.    She is currently sexually active with her girlfriend  No male partner    Exercise:  Has been walking around neighborhood at least twice a day  Her weight was in 280s   Has cut out sodas, breads, sugars  She is doing baked foods and water  In the morning she eats ogurt, green apple and oatmeal, lunch baked chicken sandwich and baked chicken at dinner    Suspected PCOS  Says she has had extra hair growth on her chin  Reports her OBGYN mentioned PCOS and said she had cysts on ovaries  Has androgenic signs        Past Medical History:   Diagnosis Date    Diabetes (HCC)     History of  2022    HTN in pregnancy, chronic 3/16/2021    Meconium in amniotic fluid 2022    Oligohydramnios in third trimester 2022    Syphilis     treated in May    Trichimoniasis 2018    treated in this pregnancy     Current Outpatient Medications   Medication Sig Dispense Refill    chlorthalidone (HYGROTON) 25 MG tablet Take 1 tablet by mouth daily 30 tablet 3    amLODIPine (NORVASC) 10 MG tablet        No current facility-administered medications for this visit.     No Known Allergies  Review of Systems    See HPI  Objective     Vitals:    24 0921   BP: (!) 176/115   Site: Right Upper Arm   Position: Sitting   Cuff Size: Large

## 2024-07-16 ENCOUNTER — OFFICE VISIT (OUTPATIENT)
Facility: CLINIC | Age: 39
End: 2024-07-16
Payer: MEDICAID

## 2024-07-16 VITALS
RESPIRATION RATE: 18 BRPM | DIASTOLIC BLOOD PRESSURE: 98 MMHG | SYSTOLIC BLOOD PRESSURE: 144 MMHG | WEIGHT: 260 LBS | OXYGEN SATURATION: 100 % | HEIGHT: 64 IN | TEMPERATURE: 97.4 F | BODY MASS INDEX: 44.39 KG/M2 | HEART RATE: 101 BPM

## 2024-07-16 DIAGNOSIS — I10 PRIMARY HYPERTENSION: Primary | ICD-10-CM

## 2024-07-16 DIAGNOSIS — E87.6 HYPOKALEMIA: ICD-10-CM

## 2024-07-16 DIAGNOSIS — M79.604 RIGHT LEG PAIN: ICD-10-CM

## 2024-07-16 PROCEDURE — 99214 OFFICE O/P EST MOD 30 MIN: CPT | Performed by: STUDENT IN AN ORGANIZED HEALTH CARE EDUCATION/TRAINING PROGRAM

## 2024-07-16 PROCEDURE — 3080F DIAST BP >= 90 MM HG: CPT | Performed by: STUDENT IN AN ORGANIZED HEALTH CARE EDUCATION/TRAINING PROGRAM

## 2024-07-16 PROCEDURE — 3077F SYST BP >= 140 MM HG: CPT | Performed by: STUDENT IN AN ORGANIZED HEALTH CARE EDUCATION/TRAINING PROGRAM

## 2024-07-16 RX ORDER — POTASSIUM CHLORIDE 20 MEQ/1
20 TABLET, EXTENDED RELEASE ORAL DAILY
Qty: 60 TABLET | Refills: 1 | Status: SHIPPED | OUTPATIENT
Start: 2024-07-16

## 2024-07-16 RX ORDER — AMLODIPINE BESYLATE 10 MG/1
TABLET ORAL
Qty: 90 TABLET | Refills: 0 | Status: SHIPPED | OUTPATIENT
Start: 2024-07-16

## 2024-07-16 RX ORDER — POTASSIUM CHLORIDE 20 MEQ/1
20 TABLET, EXTENDED RELEASE ORAL DAILY
COMMUNITY
Start: 2024-07-03 | End: 2024-07-16 | Stop reason: SDUPTHER

## 2024-07-16 RX ORDER — CHLORTHALIDONE 25 MG/1
25 TABLET ORAL DAILY
Qty: 90 TABLET | Refills: 1 | Status: SHIPPED | OUTPATIENT
Start: 2024-07-16

## 2024-07-16 ASSESSMENT — ANXIETY QUESTIONNAIRES
1. FEELING NERVOUS, ANXIOUS, OR ON EDGE: NOT AT ALL
5. BEING SO RESTLESS THAT IT IS HARD TO SIT STILL: NOT AT ALL
3. WORRYING TOO MUCH ABOUT DIFFERENT THINGS: NOT AT ALL
IF YOU CHECKED OFF ANY PROBLEMS ON THIS QUESTIONNAIRE, HOW DIFFICULT HAVE THESE PROBLEMS MADE IT FOR YOU TO DO YOUR WORK, TAKE CARE OF THINGS AT HOME, OR GET ALONG WITH OTHER PEOPLE: NOT DIFFICULT AT ALL
4. TROUBLE RELAXING: NOT AT ALL
2. NOT BEING ABLE TO STOP OR CONTROL WORRYING: NOT AT ALL
6. BECOMING EASILY ANNOYED OR IRRITABLE: NOT AT ALL
GAD7 TOTAL SCORE: 0
7. FEELING AFRAID AS IF SOMETHING AWFUL MIGHT HAPPEN: NOT AT ALL

## 2024-07-16 ASSESSMENT — PATIENT HEALTH QUESTIONNAIRE - PHQ9
SUM OF ALL RESPONSES TO PHQ QUESTIONS 1-9: 0
2. FEELING DOWN, DEPRESSED OR HOPELESS: NOT AT ALL
1. LITTLE INTEREST OR PLEASURE IN DOING THINGS: NOT AT ALL
SUM OF ALL RESPONSES TO PHQ9 QUESTIONS 1 & 2: 0
SUM OF ALL RESPONSES TO PHQ QUESTIONS 1-9: 0

## 2024-07-16 NOTE — PROGRESS NOTES
\"Have you been to the ER, urgent care clinic since your last visit?  Hospitalized since your last visit?\"    Yes, [facility]    “Have you seen or consulted any other health care providers outside of [facility] since your last visit?”    NO          Chief Complaint   Patient presents with    Follow-up    Hypertension     BP (!) 144/98 (Site: Right Upper Arm, Position: Sitting, Cuff Size: Large Adult)   Pulse (!) 101   Temp 97.4 °F (36.3 °C) (Temporal)   Resp 18   Ht 1.626 m (5' 4\")   Wt 117.9 kg (260 lb)   SpO2 100%   BMI 44.63 kg/m²

## 2024-07-16 NOTE — PROGRESS NOTES
[facility]  Subjective       Chief Complaint   Patient presents with    Follow-up    Hypertension     HPI:  Nichelle Fitzpatrick is a 39 y.o. female here for follow up HTN    Says she has been in the ED  Originally went because of right leg pain.  Says they did work up for DVT?  No ultrasound in report  Says it has improved significantly and only hurts intermittent mostly posterior lateral to popliteal fossa    HYPERTENSION, Essential, asymptomatic   Diagnosed: 5 years ago    Reports Compliance with meds: did not understand about amlodopine but has been taking the chlorthalidone  Has not been taking her amlodopine  CHLORTHALIDONE 25 mg daily  Previously was on labetalol when pregnant    Checking BP at Home: no    HISTORY  She has 8 babies. Youngest 2 years old  SEXUAL HISTORY:  She has no plans to get pregnant.    She is currently sexually active with her girlfriend  No male partner    PCOS  Follows with obgyn  Not on metformin or spironolactone    Hasn't gotten blood work done yet    Past Medical History:   Diagnosis Date    Diabetes (HCC)     History of  2022    HTN in pregnancy, chronic 3/16/2021    Meconium in amniotic fluid 2022    Obesity     Oligohydramnios in third trimester 2022    Syphilis     treated in May    Trichimoniasis 2018    treated in this pregnancy     Current Outpatient Medications   Medication Sig Dispense Refill    amLODIPine (NORVASC) 10 MG tablet Take 0.5 tablet for three days. Then take 1 tablet daily 90 tablet 0    chlorthalidone (HYGROTON) 25 MG tablet Take 1 tablet by mouth daily 90 tablet 1    potassium chloride (KLOR-CON M) 20 MEQ extended release tablet Take 1 tablet by mouth daily 60 tablet 1     No current facility-administered medications for this visit.     No Known Allergies  Review of Systems    See HPI  Objective     Vitals:    24 1503   BP: (!) 144/98   Site: Right Upper Arm   Position: Sitting   Cuff Size: Large Adult

## 2025-01-28 ENCOUNTER — OFFICE VISIT (OUTPATIENT)
Facility: CLINIC | Age: 40
End: 2025-01-28
Payer: MEDICAID

## 2025-01-28 ENCOUNTER — TELEPHONE (OUTPATIENT)
Age: 40
End: 2025-01-28

## 2025-01-28 VITALS
DIASTOLIC BLOOD PRESSURE: 93 MMHG | TEMPERATURE: 99.1 F | HEART RATE: 100 BPM | BODY MASS INDEX: 50.02 KG/M2 | WEIGHT: 293 LBS | HEIGHT: 64 IN | RESPIRATION RATE: 18 BRPM | SYSTOLIC BLOOD PRESSURE: 163 MMHG

## 2025-01-28 DIAGNOSIS — I10 PRIMARY HYPERTENSION: Primary | ICD-10-CM

## 2025-01-28 DIAGNOSIS — R63.5 WEIGHT GAIN: ICD-10-CM

## 2025-01-28 DIAGNOSIS — R21 PAPULAR RASH: ICD-10-CM

## 2025-01-28 PROCEDURE — 3080F DIAST BP >= 90 MM HG: CPT | Performed by: STUDENT IN AN ORGANIZED HEALTH CARE EDUCATION/TRAINING PROGRAM

## 2025-01-28 PROCEDURE — 99214 OFFICE O/P EST MOD 30 MIN: CPT | Performed by: STUDENT IN AN ORGANIZED HEALTH CARE EDUCATION/TRAINING PROGRAM

## 2025-01-28 PROCEDURE — 3077F SYST BP >= 140 MM HG: CPT | Performed by: STUDENT IN AN ORGANIZED HEALTH CARE EDUCATION/TRAINING PROGRAM

## 2025-01-28 RX ORDER — TRIAMCINOLONE ACETONIDE 1 MG/G
OINTMENT TOPICAL 2 TIMES DAILY
Qty: 30 G | Refills: 0 | Status: SHIPPED | OUTPATIENT
Start: 2025-01-28 | End: 2025-02-04

## 2025-01-28 SDOH — ECONOMIC STABILITY: FOOD INSECURITY: WITHIN THE PAST 12 MONTHS, THE FOOD YOU BOUGHT JUST DIDN'T LAST AND YOU DIDN'T HAVE MONEY TO GET MORE.: NEVER TRUE

## 2025-01-28 SDOH — ECONOMIC STABILITY: FOOD INSECURITY: WITHIN THE PAST 12 MONTHS, YOU WORRIED THAT YOUR FOOD WOULD RUN OUT BEFORE YOU GOT MONEY TO BUY MORE.: NEVER TRUE

## 2025-01-28 ASSESSMENT — PATIENT HEALTH QUESTIONNAIRE - PHQ9
SUM OF ALL RESPONSES TO PHQ QUESTIONS 1-9: 0
SUM OF ALL RESPONSES TO PHQ QUESTIONS 1-9: 0
SUM OF ALL RESPONSES TO PHQ9 QUESTIONS 1 & 2: 0
SUM OF ALL RESPONSES TO PHQ QUESTIONS 1-9: 0
1. LITTLE INTEREST OR PLEASURE IN DOING THINGS: NOT AT ALL
SUM OF ALL RESPONSES TO PHQ QUESTIONS 1-9: 0
2. FEELING DOWN, DEPRESSED OR HOPELESS: NOT AT ALL

## 2025-01-28 NOTE — PROGRESS NOTES
\"Have you been to the ER, urgent care clinic since your last visit?  Hospitalized since your last visit?\"    NO    “Have you seen or consulted any other health care providers outside our system since your last visit?”    NO      Chief Complaint   Patient presents with    4 month follow up       BP (!) 176/106 (Site: Right Upper Arm, Position: Sitting, Cuff Size: Large Adult)   Pulse 100   Temp 99.1 °F (37.3 °C) (Temporal)   Resp 18   Ht 1.626 m (5' 4\")   Wt (!) 137.9 kg (304 lb)   BMI 52.18 kg/m²

## 2025-01-28 NOTE — TELEPHONE ENCOUNTER
Called patient regarding referral to our [facility]. Patient did not answer, phone number no longer in service.

## 2025-01-28 NOTE — PROGRESS NOTES
[facility]  Subjective  Chief Complaint   Patient presents with    4 month follow up     HPI:  Nichelle Fitzpatrick  : 1985    PCP: Maribel Callaway DO    History of Present Illness  The patient is a 39-year-old female who presents today for a hypertension follow-up, weight management, and rash.    Her blood pressure was uncontrolled upon arrival at 176/106. She did not take her blood pressure medication today due to frequent urination but has been taking it regularly otherwise. She reports that her blood pressure was well-managed when she adhered to her dietary regimen and medication schedule. However, she has recently deviated from her diet due to personal circumstances, including caring for her ill mother and children, which has led to neglect of her own health. She does not monitor her blood pressure at home as she does not have a cuff. She expresses a desire to resume her dietary regimen. She reports no chest pain or shortness of breath.    She has experienced significant weight gain, increasing from 250 to 304 pounds since her last visit. This weight gain has resulted in fatigue and limited mobility. She also reports shortness of breath during physical exertion, necessitating rest periods. She is not interested in surgical intervention for weight loss but is open to pharmacological appetite suppression.    She has developed a rash, which she has previously reported. The rash is pruritic, but she has no history of eczema. She has not attempted any treatments for the rash. She frequently uses hand  during visits to her ill mother.    FAMILY HISTORY  Eczema runs in her family.    Objective  Vitals:    25 1044 25 1118   BP: (!) 176/106 (!) 163/93   Site: Right Upper Arm Left Upper Arm   Position: Sitting Sitting   Cuff Size: Large Adult Large Adult   Pulse: 100    Resp: 18    Temp: 99.1 °F (37.3 °C)    TempSrc: Temporal    Weight: (!) 137.9 kg (304 lb)

## 2025-01-29 LAB
ALBUMIN SERPL-MCNC: 4.1 G/DL (ref 3.9–4.9)
ALP SERPL-CCNC: 116 IU/L (ref 44–121)
ALT SERPL-CCNC: 10 IU/L (ref 0–32)
AST SERPL-CCNC: 14 IU/L (ref 0–40)
BILIRUB SERPL-MCNC: 0.3 MG/DL (ref 0–1.2)
BUN SERPL-MCNC: 16 MG/DL (ref 6–20)
BUN/CREAT SERPL: 16 (ref 9–23)
CALCIUM SERPL-MCNC: 9.3 MG/DL (ref 8.7–10.2)
CHLORIDE SERPL-SCNC: 102 MMOL/L (ref 96–106)
CHOLEST SERPL-MCNC: 143 MG/DL (ref 100–199)
CO2 SERPL-SCNC: 24 MMOL/L (ref 20–29)
CREAT SERPL-MCNC: 1 MG/DL (ref 0.57–1)
EGFRCR SERPLBLD CKD-EPI 2021: 74 ML/MIN/1.73
GLOBULIN SER CALC-MCNC: 3.4 G/DL (ref 1.5–4.5)
GLUCOSE SERPL-MCNC: 97 MG/DL (ref 70–99)
HBA1C MFR BLD: 6.2 % (ref 4.8–5.6)
HDLC SERPL-MCNC: 41 MG/DL
LDLC SERPL CALC-MCNC: 85 MG/DL (ref 0–99)
POTASSIUM SERPL-SCNC: 3.7 MMOL/L (ref 3.5–5.2)
PROT SERPL-MCNC: 7.5 G/DL (ref 6–8.5)
SODIUM SERPL-SCNC: 141 MMOL/L (ref 134–144)
TRIGL SERPL-MCNC: 91 MG/DL (ref 0–149)
TSH SERPL DL<=0.005 MIU/L-ACNC: 1.22 UIU/ML (ref 0.45–4.5)
VLDLC SERPL CALC-MCNC: 17 MG/DL (ref 5–40)

## 2025-01-30 PROBLEM — R80.9 MICROALBUMINURIA: Status: ACTIVE | Noted: 2025-01-30

## 2025-01-30 PROBLEM — R73.03 PREDIABETES: Status: ACTIVE | Noted: 2025-01-30

## 2025-01-30 LAB
ALBUMIN/CREAT UR: 42 MG/G CREAT (ref 0–29)
CREAT UR-MCNC: 83.9 MG/DL
MICROALBUMIN UR-MCNC: 35.1 UG/ML

## 2025-02-05 ENCOUNTER — TELEPHONE (OUTPATIENT)
Facility: CLINIC | Age: 40
End: 2025-02-05

## 2025-02-05 NOTE — TELEPHONE ENCOUNTER
----- Message from Dr. Maribel Callaway, DO sent at 2/4/2025  5:08 PM EST -----  Patient not active on mychart.  Can we please call with results:  See result note

## 2025-05-09 DIAGNOSIS — E87.6 HYPOKALEMIA: ICD-10-CM

## 2025-05-09 DIAGNOSIS — I10 PRIMARY HYPERTENSION: ICD-10-CM

## 2025-05-09 NOTE — TELEPHONE ENCOUNTER
Method of communication:  []Fax []Phone call [x]In person   []Other:    Who is making request:Patient  What medication/s (include strength and dosing):  Potassium Chloride 20 mg extended release tablet    Chlorthalidone 25 mg tablet    This is for a:   [x]Refill    []New medication request  []Follow up on prior request    Pharmacy:[facility]  Best contact for patient:[phone]    Additional notes: Patient states she is having surgery Monday and needs the potassium medication due to being low.

## 2025-05-09 NOTE — TELEPHONE ENCOUNTER
Date of last OV:01/2025  Future OV visit scheduled:  [] Yes -> Date:   [x] No    Last Refill: [] N/A  Date:07/16/24  Qty:60  # of refills:1    Med pending for provider review:  [] Yes  [] No (provide reason why):     Requested Pharmacy updated in ENC:  [] Yes    Applicable labs (provide date of completion):  [] Diabetic med- A1c:  [] Cholesterol med- Lipids:  [] BP med- CMP or BMP:   [] Thyroid med- TSH:  [] Gout med- Uric acid:  [] Prostate med- PSA:  [] Other (provide what type of med and lab):    Additional notes:

## 2025-05-12 RX ORDER — CHLORTHALIDONE 25 MG/1
25 TABLET ORAL DAILY
Qty: 90 TABLET | Refills: 1 | Status: SHIPPED | OUTPATIENT
Start: 2025-05-12

## 2025-05-12 RX ORDER — POTASSIUM CHLORIDE 1500 MG/1
20 TABLET, EXTENDED RELEASE ORAL DAILY
Qty: 60 TABLET | Refills: 1 | OUTPATIENT
Start: 2025-05-12

## 2025-05-12 RX ORDER — POTASSIUM CHLORIDE 1500 MG/1
20 TABLET, EXTENDED RELEASE ORAL DAILY
Qty: 60 TABLET | Refills: 1 | Status: SHIPPED | OUTPATIENT
Start: 2025-05-12

## 2025-05-12 NOTE — TELEPHONE ENCOUNTER
Requested Prescriptions     Pending Prescriptions Disp Refills    chlorthalidone (HYGROTON) 25 MG tablet 90 tablet 1     Sig: Take 1 tablet by mouth daily    potassium chloride (KLOR-CON M) 20 MEQ extended release tablet 60 tablet 1     Sig: Take 1 tablet by mouth daily       [] : Resident [[email or web address]] : \par Saw and examined patient and agree with plan with modifications.\par ABS\par

## (undated) DEVICE — DRAPE C-SECTION W/WINDOW --

## (undated) DEVICE — FORCEPS TISS L6.25IN S STL ADAIR STYL

## (undated) DEVICE — SUTURE PLN GUT SZ 2-0 L27IN ABSRB YELLOWISH TAN L70MM XLH 53T

## (undated) DEVICE — REM POLYHESIVE ADULT PATIENT RETURN ELECTRODE: Brand: VALLEYLAB

## (undated) DEVICE — SUT VCRL + 2-0 36IN CT1 UD --

## (undated) DEVICE — SUTURE VCRL SZ 0 L36IN ABSRB UD L40MM CT 1/2 CIR TAPERPOINT J958H

## (undated) DEVICE — C-SECTION: Brand: MEDLINE INDUSTRIES, INC.

## (undated) DEVICE — STERILE POLYISOPRENE POWDER-FREE SURGICAL GLOVES WITH EMOLLIENT COATING: Brand: PROTEXIS

## (undated) DEVICE — TRAY URIN CATH PED 16FR BLLN 5CC INDWL STR TIP INF CTRL

## (undated) DEVICE — HANDLE SURG LIGHT OB

## (undated) DEVICE — APPLICATOR SCRB 26ML TEAL STRL -- CHLORAPREP 26ML

## (undated) DEVICE — SOL IRR SOD CL 0.9% 1000ML BTL --

## (undated) DEVICE — SUT MONOCRYL PLUS UD 4-0 --

## (undated) DEVICE — BAG,SPONGE COUNTER,CLEAR,50/BX,5BX/CS: Brand: MEDLINE